# Patient Record
Sex: FEMALE | Race: WHITE | NOT HISPANIC OR LATINO | Employment: FULL TIME | RURAL
[De-identification: names, ages, dates, MRNs, and addresses within clinical notes are randomized per-mention and may not be internally consistent; named-entity substitution may affect disease eponyms.]

---

## 2022-02-25 ENCOUNTER — HOSPITAL ENCOUNTER (INPATIENT)
Facility: HOSPITAL | Age: 27
LOS: 2 days | Discharge: HOME | DRG: 639 | End: 2022-02-27
Attending: HOSPITALIST | Admitting: HOSPITALIST
Payer: COMMERCIAL

## 2022-02-25 ENCOUNTER — HOSPITAL ENCOUNTER (EMERGENCY)
Facility: HOSPITAL | Age: 27
Discharge: OTHER INSTITUTION | End: 2022-02-25
Payer: COMMERCIAL

## 2022-02-25 VITALS
WEIGHT: 180.81 LBS | SYSTOLIC BLOOD PRESSURE: 126 MMHG | DIASTOLIC BLOOD PRESSURE: 59 MMHG | HEIGHT: 65 IN | BODY MASS INDEX: 30.12 KG/M2 | TEMPERATURE: 99 F | OXYGEN SATURATION: 97 % | HEART RATE: 118 BPM | RESPIRATION RATE: 20 BRPM

## 2022-02-25 DIAGNOSIS — E11.10 DKA (DIABETIC KETOACIDOSIS): ICD-10-CM

## 2022-02-25 DIAGNOSIS — E10.10 DIABETIC KETOACIDOSIS WITHOUT COMA ASSOCIATED WITH TYPE 1 DIABETES MELLITUS: Primary | ICD-10-CM

## 2022-02-25 DIAGNOSIS — E10.10 DIABETIC KETOACIDOSIS WITHOUT COMA ASSOCIATED WITH TYPE 1 DIABETES MELLITUS: ICD-10-CM

## 2022-02-25 DIAGNOSIS — E87.5 HYPERKALEMIA: ICD-10-CM

## 2022-02-25 DIAGNOSIS — R06.82 TACHYPNEA: ICD-10-CM

## 2022-02-25 DIAGNOSIS — E10.9 TYPE 1 DIABETES MELLITUS WITHOUT COMPLICATION: ICD-10-CM

## 2022-02-25 DIAGNOSIS — R11.2 NON-INTRACTABLE VOMITING WITH NAUSEA, UNSPECIFIED VOMITING TYPE: ICD-10-CM

## 2022-02-25 LAB
ALBUMIN SERPL BCP-MCNC: 4.5 G/DL (ref 3.5–5)
ALBUMIN/GLOB SERPL: 1.1 {RATIO}
ALP SERPL-CCNC: 132 U/L (ref 37–98)
ALT SERPL W P-5'-P-CCNC: 32 U/L (ref 13–56)
ANION GAP SERPL CALCULATED.3IONS-SCNC: 42 MMOL/L (ref 7–16)
AST SERPL W P-5'-P-CCNC: 44 U/L (ref 15–37)
BASOPHILS # BLD AUTO: 0.02 K/UL (ref 0–0.2)
BASOPHILS NFR BLD AUTO: 0.1 % (ref 0–1)
BASOPHILS NFR BLD MANUAL: 1 % (ref 0–1)
BILIRUB SERPL-MCNC: 0.8 MG/DL (ref 0–1.2)
BILIRUB UR QL STRIP: NEGATIVE
BUN SERPL-MCNC: 20 MG/DL (ref 7–18)
BUN/CREAT SERPL: 19 (ref 6–20)
CALCIUM SERPL-MCNC: 9.4 MG/DL (ref 8.5–10.1)
CHLORIDE SERPL-SCNC: 99 MMOL/L (ref 98–107)
CLARITY UR: CLEAR
CO2 SERPL-SCNC: 5 MMOL/L (ref 21–32)
COLOR UR: YELLOW
CREAT SERPL-MCNC: 1.08 MG/DL (ref 0.55–1.02)
DIFFERENTIAL METHOD BLD: ABNORMAL
EOSINOPHIL # BLD AUTO: 0 K/UL (ref 0–0.5)
EOSINOPHIL NFR BLD AUTO: 0 % (ref 1–4)
EOSINOPHIL NFR BLD MANUAL: 1 % (ref 1–4)
ERYTHROCYTE [DISTWIDTH] IN BLOOD BY AUTOMATED COUNT: 12.5 % (ref 11.5–14.5)
FLUAV AG UPPER RESP QL IA.RAPID: NEGATIVE
FLUBV AG UPPER RESP QL IA.RAPID: NEGATIVE
GLOBULIN SER-MCNC: 4 G/DL (ref 2–4)
GLUCOSE SERPL-MCNC: 179 MG/DL (ref 70–105)
GLUCOSE SERPL-MCNC: 383 MG/DL (ref 74–106)
GLUCOSE UR STRIP-MCNC: 500 MG/DL
HCG UR QL IA.RAPID: NEGATIVE
HCO3 UR-SCNC: 5.3 MMOL/L (ref 24–28)
HCO3 UR-SCNC: <3 MMOL/L (ref 21–28)
HCT VFR BLD AUTO: 42.6 % (ref 38–47)
HGB BLD-MCNC: 14.5 G/DL (ref 12–16)
KETONES UR STRIP-SCNC: >=160 MG/DL
LACTATE SERPL-SCNC: 2.5 MMOL/L (ref 0.4–2)
LEUKOCYTE ESTERASE UR QL STRIP: NEGATIVE
LIPASE SERPL-CCNC: 37 U/L (ref 73–393)
LYMPHOCYTES # BLD AUTO: 1.03 K/UL (ref 1–4.8)
LYMPHOCYTES NFR BLD AUTO: 4.2 % (ref 27–41)
LYMPHOCYTES NFR BLD MANUAL: 4 % (ref 27–41)
MAGNESIUM SERPL-MCNC: 2 MG/DL (ref 1.7–2.3)
MCH RBC QN AUTO: 28.2 PG (ref 27–31)
MCHC RBC AUTO-ENTMCNC: 34 G/DL (ref 32–36)
MCV RBC AUTO: 82.9 FL (ref 80–96)
MONOCYTES # BLD AUTO: 0.77 K/UL (ref 0–0.8)
MONOCYTES NFR BLD AUTO: 3.1 % (ref 2–6)
MONOCYTES NFR BLD MANUAL: 4 % (ref 2–6)
MPC BLD CALC-MCNC: 11 FL (ref 9.4–12.4)
NEUTROPHILS # BLD AUTO: 22.7 K/UL (ref 1.8–7.7)
NEUTROPHILS NFR BLD AUTO: 92.6 % (ref 53–65)
NEUTS SEG NFR BLD MANUAL: 90 % (ref 50–62)
NITRITE UR QL STRIP: NEGATIVE
PCO2 BLDA: 32 MMHG (ref 41–51)
PCO2 BLDA: 7 MMHG (ref 35–48)
PH SMN: 6.83 [PH] (ref 7.32–7.42)
PH SMN: 7.21 [PH] (ref 7.35–7.45)
PH UR STRIP: 5.5 PH UNITS
PLATELET # BLD AUTO: 343 K/UL (ref 150–400)
PO2 BLDA: 53 MMHG (ref 25–40)
PO2 BLDA: 65 MMHG (ref 83–108)
POC BASE EXCESS: -28.1 MMOL/L (ref -2–3)
POC BASE EXCESS: ABNORMAL MMOL/L (ref -2–3)
POC CO2: 6.3 MMOL/L
POC CO2: ABNORMAL MMOL/L
POC SATURATED O2: 52 %
POC SATURATED O2: ABNORMAL %
POTASSIUM SERPL-SCNC: 5.5 MMOL/L (ref 3.5–5.1)
PROT SERPL-MCNC: 8.5 G/DL (ref 6.4–8.2)
PROT UR QL STRIP: NEGATIVE
RBC # BLD AUTO: 5.14 M/UL (ref 4.2–5.4)
RBC # UR STRIP: ABNORMAL /UL
SARS-COV+SARS-COV-2 AG RESP QL IA.RAPID: NEGATIVE
SODIUM SERPL-SCNC: 140 MMOL/L (ref 136–145)
SP GR UR STRIP: 1.02
UROBILINOGEN UR STRIP-ACNC: 0.2 MG/DL
WBC # BLD AUTO: 24.52 K/UL (ref 4.5–11)
WBC #/AREA URNS HPF: NORMAL /HPF

## 2022-02-25 PROCEDURE — 96361 HYDRATE IV INFUSION ADD-ON: CPT

## 2022-02-25 PROCEDURE — 82962 GLUCOSE BLOOD TEST: CPT

## 2022-02-25 PROCEDURE — 83605 ASSAY OF LACTIC ACID: CPT | Performed by: NURSE PRACTITIONER

## 2022-02-25 PROCEDURE — 25000003 PHARM REV CODE 250: Performed by: NURSE PRACTITIONER

## 2022-02-25 PROCEDURE — 93010 EKG 12-LEAD: ICD-10-PCS | Mod: ,,, | Performed by: STUDENT IN AN ORGANIZED HEALTH CARE EDUCATION/TRAINING PROGRAM

## 2022-02-25 PROCEDURE — 83735 ASSAY OF MAGNESIUM: CPT | Performed by: NURSE PRACTITIONER

## 2022-02-25 PROCEDURE — 99285 PR EMERGENCY DEPT VISIT,LEVEL V: ICD-10-PCS | Mod: ,,, | Performed by: NURSE PRACTITIONER

## 2022-02-25 PROCEDURE — 36415 COLL VENOUS BLD VENIPUNCTURE: CPT | Performed by: NURSE PRACTITIONER

## 2022-02-25 PROCEDURE — 20000000 HC ICU ROOM

## 2022-02-25 PROCEDURE — 82803 BLOOD GASES ANY COMBINATION: CPT

## 2022-02-25 PROCEDURE — 63600175 PHARM REV CODE 636 W HCPCS: Performed by: NURSE PRACTITIONER

## 2022-02-25 PROCEDURE — 83690 ASSAY OF LIPASE: CPT | Performed by: NURSE PRACTITIONER

## 2022-02-25 PROCEDURE — 85025 COMPLETE CBC W/AUTO DIFF WBC: CPT | Performed by: NURSE PRACTITIONER

## 2022-02-25 PROCEDURE — 96374 THER/PROPH/DIAG INJ IV PUSH: CPT

## 2022-02-25 PROCEDURE — 80053 COMPREHEN METABOLIC PANEL: CPT | Performed by: NURSE PRACTITIONER

## 2022-02-25 PROCEDURE — 93005 ELECTROCARDIOGRAM TRACING: CPT

## 2022-02-25 PROCEDURE — 99285 EMERGENCY DEPT VISIT HI MDM: CPT | Mod: 25

## 2022-02-25 PROCEDURE — 93010 ELECTROCARDIOGRAM REPORT: CPT | Mod: ,,, | Performed by: STUDENT IN AN ORGANIZED HEALTH CARE EDUCATION/TRAINING PROGRAM

## 2022-02-25 PROCEDURE — 99285 EMERGENCY DEPT VISIT HI MDM: CPT | Mod: ,,, | Performed by: NURSE PRACTITIONER

## 2022-02-25 PROCEDURE — 87428 SARSCOV & INF VIR A&B AG IA: CPT | Performed by: NURSE PRACTITIONER

## 2022-02-25 PROCEDURE — 81025 URINE PREGNANCY TEST: CPT | Performed by: NURSE PRACTITIONER

## 2022-02-25 PROCEDURE — 81001 URINALYSIS AUTO W/SCOPE: CPT | Performed by: NURSE PRACTITIONER

## 2022-02-25 PROCEDURE — 25000003 PHARM REV CODE 250: Performed by: HOSPITALIST

## 2022-02-25 RX ORDER — DEXTROSE MONOHYDRATE 100 MG/ML
INJECTION, SOLUTION INTRAVENOUS
Status: DISCONTINUED | OUTPATIENT
Start: 2022-02-26 | End: 2022-02-27 | Stop reason: HOSPADM

## 2022-02-25 RX ORDER — SODIUM CHLORIDE 9 MG/ML
1000 INJECTION, SOLUTION INTRAVENOUS
Status: COMPLETED | OUTPATIENT
Start: 2022-02-25 | End: 2022-02-25

## 2022-02-25 RX ORDER — ONDANSETRON 2 MG/ML
4 INJECTION INTRAMUSCULAR; INTRAVENOUS
Status: COMPLETED | OUTPATIENT
Start: 2022-02-25 | End: 2022-02-25

## 2022-02-25 RX ORDER — DEXTROSE MONOHYDRATE 100 MG/ML
INJECTION, SOLUTION INTRAVENOUS
Status: DISCONTINUED | OUTPATIENT
Start: 2022-02-26 | End: 2022-02-26

## 2022-02-25 RX ORDER — TRAZODONE HYDROCHLORIDE 50 MG/1
50 TABLET ORAL NIGHTLY PRN
Status: DISCONTINUED | OUTPATIENT
Start: 2022-02-26 | End: 2022-02-27 | Stop reason: HOSPADM

## 2022-02-25 RX ORDER — ONDANSETRON 2 MG/ML
8 INJECTION INTRAMUSCULAR; INTRAVENOUS EVERY 6 HOURS PRN
Status: DISCONTINUED | OUTPATIENT
Start: 2022-02-26 | End: 2022-02-27 | Stop reason: HOSPADM

## 2022-02-25 RX ORDER — BISACODYL 5 MG
10 TABLET, DELAYED RELEASE (ENTERIC COATED) ORAL DAILY PRN
Status: DISCONTINUED | OUTPATIENT
Start: 2022-02-26 | End: 2022-02-27 | Stop reason: HOSPADM

## 2022-02-25 RX ORDER — SODIUM CHLORIDE 0.9 % (FLUSH) 0.9 %
10 SYRINGE (ML) INJECTION
Status: DISCONTINUED | OUTPATIENT
Start: 2022-02-26 | End: 2022-02-27 | Stop reason: HOSPADM

## 2022-02-25 RX ORDER — SODIUM CHLORIDE 450 MG/100ML
INJECTION, SOLUTION INTRAVENOUS CONTINUOUS
Status: DISCONTINUED | OUTPATIENT
Start: 2022-02-26 | End: 2022-02-26

## 2022-02-25 RX ORDER — TRIPROLIDINE/PSEUDOEPHEDRINE 2.5MG-60MG
200 TABLET ORAL
Status: DISCONTINUED | OUTPATIENT
Start: 2022-02-25 | End: 2022-02-25

## 2022-02-25 RX ORDER — LISDEXAMFETAMINE DIMESYLATE 40 MG/1
40 CAPSULE ORAL DAILY
COMMUNITY

## 2022-02-25 RX ORDER — IBUPROFEN 600 MG/1
600 TABLET ORAL
Status: COMPLETED | OUTPATIENT
Start: 2022-02-25 | End: 2022-02-25

## 2022-02-25 RX ORDER — ACETAMINOPHEN 500 MG
1000 TABLET ORAL EVERY 6 HOURS PRN
Status: DISCONTINUED | OUTPATIENT
Start: 2022-02-26 | End: 2022-02-27 | Stop reason: HOSPADM

## 2022-02-25 RX ORDER — GUAIFENESIN/DEXTROMETHORPHAN 100-10MG/5
10 SYRUP ORAL EVERY 6 HOURS PRN
Status: DISCONTINUED | OUTPATIENT
Start: 2022-02-26 | End: 2022-02-27 | Stop reason: HOSPADM

## 2022-02-25 RX ORDER — INSULIN LISPRO 100 [IU]/ML
INJECTION, SOLUTION INTRAVENOUS; SUBCUTANEOUS
COMMUNITY

## 2022-02-25 RX ORDER — ENOXAPARIN SODIUM 100 MG/ML
40 INJECTION SUBCUTANEOUS EVERY 24 HOURS
Status: DISCONTINUED | OUTPATIENT
Start: 2022-02-26 | End: 2022-02-27 | Stop reason: HOSPADM

## 2022-02-25 RX ORDER — SIMETHICONE 80 MG
1 TABLET,CHEWABLE ORAL 3 TIMES DAILY PRN
Status: DISCONTINUED | OUTPATIENT
Start: 2022-02-26 | End: 2022-02-27 | Stop reason: HOSPADM

## 2022-02-25 RX ADMIN — SODIUM CHLORIDE 1000 ML: 9 INJECTION, SOLUTION INTRAVENOUS at 11:02

## 2022-02-25 RX ADMIN — IBUPROFEN 600 MG: 600 TABLET ORAL at 08:02

## 2022-02-25 RX ADMIN — ONDANSETRON 4 MG: 2 INJECTION INTRAMUSCULAR; INTRAVENOUS at 05:02

## 2022-02-25 RX ADMIN — SODIUM CHLORIDE 1000 ML: 9 INJECTION, SOLUTION INTRAVENOUS at 06:02

## 2022-02-25 RX ADMIN — SODIUM CHLORIDE 1000 ML: 9 INJECTION, SOLUTION INTRAVENOUS at 07:02

## 2022-02-25 RX ADMIN — SODIUM CHLORIDE 1000 ML: 9 INJECTION, SOLUTION INTRAVENOUS at 04:02

## 2022-02-25 RX ADMIN — SODIUM CHLORIDE: 4.5 INJECTION, SOLUTION INTRAVENOUS at 11:02

## 2022-02-25 NOTE — ED NOTES
Ambulatory to restroom for urine sample. Back to bed. Placed back on monitors. Will continue to monitor. Denies needs at this time. Will continue to monitor.

## 2022-02-25 NOTE — ED PROVIDER NOTES
Encounter Date: 2/25/2022       History     Chief Complaint   Patient presents with    Vomiting    Hyperglycemia     25 y/o WF with PMHx of IDDM presents pov with c/o hyperglycemia and N/V starting yesterday. Patient has an insulin pump. Mom states patient's breath has a fruity odor. Mom states BS pta 304. Patient denies previous dysuria, cough, sore throat, cough, abd pain or diarrhea.        Review of patient's allergies indicates:  No Known Allergies  Past Medical History:   Diagnosis Date    Diabetes mellitus      Past Surgical History:   Procedure Laterality Date    OOPHORECTOMY Right      History reviewed. No pertinent family history.  Social History     Tobacco Use    Smoking status: Never Smoker    Smokeless tobacco: Never Used   Substance Use Topics    Drug use: Never     Review of Systems   Constitutional: Negative.    HENT: Negative.    Eyes: Negative.    Respiratory: Negative for apnea, cough, choking, chest tightness, shortness of breath, wheezing and stridor.    Cardiovascular: Negative for chest pain, palpitations and leg swelling.   Gastrointestinal: Positive for nausea. Negative for abdominal distention, abdominal pain, anal bleeding, blood in stool, constipation, diarrhea, rectal pain and vomiting.   Endocrine: Negative for cold intolerance, heat intolerance, polydipsia, polyphagia and polyuria.   Genitourinary: Negative.    Musculoskeletal: Negative.    Skin: Negative.    Neurological: Negative.    Psychiatric/Behavioral: Negative.        Physical Exam     Initial Vitals [02/25/22 1642]   BP Pulse Resp Temp SpO2   137/84 (!) 114 (!) 21 98.2 °F (36.8 °C) 100 %      MAP       --         Physical Exam    Nursing note and vitals reviewed.  Constitutional: She appears well-developed and well-nourished.   HENT:   Head: Normocephalic.   Eyes: Conjunctivae and EOM are normal. Pupils are equal, round, and reactive to light.   Neck: Neck supple.   Normal range of motion.  Cardiovascular: Regular  rhythm, normal heart sounds and intact distal pulses. Tachycardia present.  Exam reveals no gallop and no friction rub.    No murmur heard.  Pulmonary/Chest: Breath sounds normal. No respiratory distress. She has no wheezes. She has no rhonchi. She has no rales. She exhibits no tenderness.   Abdominal: Abdomen is soft. Bowel sounds are normal. She exhibits no distension and no mass. There is no abdominal tenderness. There is no rebound and no guarding.   Musculoskeletal:         General: Normal range of motion.      Cervical back: Normal range of motion and neck supple.     Lymphadenopathy:     She has no cervical adenopathy.   Neurological: She is alert and oriented to person, place, and time. She has normal strength.   Skin: Skin is warm and dry. Capillary refill takes less than 2 seconds.   Psychiatric: She has a normal mood and affect. Her behavior is normal. Judgment and thought content normal.         Medical Screening Exam   See Full Note    ED Course   Procedures  Labs Reviewed   COMPREHENSIVE METABOLIC PANEL - Abnormal; Notable for the following components:       Result Value    Potassium 5.5 (*)     CO2 5 (*)     Anion Gap 42 (*)     Glucose 383 (*)     BUN 20 (*)     Creatinine 1.08 (*)     Total Protein 8.5 (*)     Alk Phos 132 (*)     AST 44 (*)     All other components within normal limits   LIPASE - Abnormal; Notable for the following components:    Lipase 37 (*)     All other components within normal limits   URINALYSIS - Abnormal; Notable for the following components:    Glucose,   (*)     Ketones, UA >=160 (*)     Blood, UA Trace-Intact (*)     All other components within normal limits   LACTIC ACID, PLASMA - Abnormal; Notable for the following components:    Lactic Acid 2.5 (*)     All other components within normal limits   CBC WITH DIFFERENTIAL - Abnormal; Notable for the following components:    WBC 24.52 (*)     Neutrophils % 92.6 (*)     Lymphocytes % 4.2 (*)     Neutrophils, Abs 22.70  (*)     Eosinophils % 0.0 (*)     All other components within normal limits   MANUAL DIFFERENTIAL - Abnormal; Notable for the following components:    Segmented Neutrophils, Man % 90 (*)     Lymphocytes, Man % 4 (*)     All other components within normal limits   MAGNESIUM - Normal   HCG QUALITATIVE URINE - Normal   SARS-COV2 (COVID) W/ FLU ANTIGEN - Normal    Narrative:     Negative SARS-CoV results should not be used as the sole basis for treatment or patient management decisions; negative results should be considered in the context of a patient's recent exposures, history and the presene of clinical signs and symptoms consistent with COVID-19.  Negative results should be treated as presumptive and confirmed by molecular assay, if necessary for patient management.   URINALYSIS, MICROSCOPIC - Normal   CBC W/ AUTO DIFFERENTIAL    Narrative:     The following orders were created for panel order CBC auto differential.  Procedure                               Abnormality         Status                     ---------                               -----------         ------                     CBC with Differential[[id number]]        Abnormal            Final result               Manual Differential[[id number]]          Abnormal            Final result                 Please view results for these tests on the individual orders.     EKG Readings: (Independently Interpreted)   Rhythm: Sinus Tachycardia. Heart Rate: 115. Ectopy: No Ectopy. Conduction: Normal. ST Segments: Normal ST Segments. T Waves: Normal. Axis: Normal. Clinical Impression: Sinus Tachycardia     ECG Results          EKG 12-lead (In process)  Result time 02/25/22 19:04:27    In process by Interface, Lab In [facility] (02/25/22 19:04:27)                 Narrative:    Test Reason : E87.5,    Vent. Rate : 115 BPM     Atrial Rate : 115 BPM     P-R Int : 128 ms          QRS Dur : 076 ms      QT Int : 340 ms       P-R-T Axes : 073 076 041 degrees     QTc Int : 470  ms    Sinus tachycardia  Otherwise normal ECG  No previous ECGs available    Referred By: [email or web address]   SELF           Confirmed By:                             Imaging Results          X-Ray Chest 1 View (Final result)  Result time 02/25/22 17:45:49    Final result by Dipak Cameron MD (02/25/22 17:45:49)                 Impression:      No acute findings.      Electronically signed by: Dipak Cameron  Date:    02/25/2022  Time:    17:45             Narrative:    EXAMINATION:  XR CHEST 1 VIEW    CLINICAL HISTORY:  Tachypnea, not elsewhere classified    TECHNIQUE:  Single frontal view of the chest was performed.    COMPARISON:  06/02/2017    FINDINGS:  Heart size normal. Lungs clear. No pneumothorax or pleural effusion.                                 Medications   ibuprofen tablet 600 mg (has no administration in time range)   sodium chloride 0.9% bolus 1,000 mL (0 mLs Intravenous Stopped 2/25/22 1755)   ondansetron injection 4 mg (4 mg Intravenous Given 2/25/22 1700)   sodium chloride 0.9% bolus 1,000 mL (0 mLs Intravenous Stopped 2/25/22 1923)   0.9%  NaCl infusion (1,000 mLs Intravenous New Bag 2/25/22 1949)                 ED Course as of 02/25/22 2047 Fri Feb 25, 2022 1826 Consulted with Dr. Arevalo ([facility] TE). Plan is to continue IV fluids, monitor BS and potassium levels, and transfer for Intensive care. Spoke with [facility]. Advised to contact [facility]. [[place]]   1901 Patient states nausea improved.  [[place]]   1910 Current glucose 258. In NAD. [[place]]   2015 Received call back from [facility] and spoke with Mitzi. Patient will be transferred to [facility] room # 6.  will be accepting physician. [[place]]      ED Course User Index  [[place]] FEDERICO Thompson          Clinical Impression:   Final diagnoses:  [R06.82] Tachypnea  [E87.5] Hyperkalemia  [E10.10] Diabetic ketoacidosis without coma associated with type 1 diabetes mellitus (Primary)  [R11.2] Non-intractable vomiting with nausea, unspecified  vomiting type          ED Disposition Condition    Transfer to Another Facility Stable              FEDERICO Thompson  02/25/22 2031       FEDERICO Thompson  02/25/22 2031       FEDERICO Thompson  02/25/22 2047

## 2022-02-25 NOTE — ED TRIAGE NOTES
Presents to ER with reports of vomiting since yesterday. Mother reports giving the patient a 25mg Phenergan suppository approx. 3.5 hours PTA. Patient uses a Insulin pump and states blood glucose was 304.

## 2022-02-26 PROBLEM — E10.10 DIABETIC KETOACIDOSIS ASSOCIATED WITH TYPE 1 DIABETES MELLITUS: Status: ACTIVE | Noted: 2022-02-26

## 2022-02-26 LAB
ANION GAP SERPL CALCULATED.3IONS-SCNC: 12 MMOL/L (ref 7–16)
ANION GAP SERPL CALCULATED.3IONS-SCNC: 13 MMOL/L (ref 7–16)
ANION GAP SERPL CALCULATED.3IONS-SCNC: 13 MMOL/L (ref 7–16)
ANION GAP SERPL CALCULATED.3IONS-SCNC: 20 MMOL/L (ref 7–16)
BUN SERPL-MCNC: 13 MG/DL (ref 7–18)
BUN SERPL-MCNC: 9 MG/DL (ref 7–18)
BUN/CREAT SERPL: 10 (ref 6–20)
BUN/CREAT SERPL: 10 (ref 6–20)
BUN/CREAT SERPL: 11 (ref 6–20)
BUN/CREAT SERPL: 14 (ref 6–20)
CALCIUM SERPL-MCNC: 7.8 MG/DL (ref 8.5–10.1)
CALCIUM SERPL-MCNC: 7.9 MG/DL (ref 8.5–10.1)
CALCIUM SERPL-MCNC: 7.9 MG/DL (ref 8.5–10.1)
CALCIUM SERPL-MCNC: 8.1 MG/DL (ref 8.5–10.1)
CHLORIDE SERPL-SCNC: 104 MMOL/L (ref 98–107)
CHLORIDE SERPL-SCNC: 106 MMOL/L (ref 98–107)
CHLORIDE SERPL-SCNC: 106 MMOL/L (ref 98–107)
CHLORIDE SERPL-SCNC: 107 MMOL/L (ref 98–107)
CO2 SERPL-SCNC: 16 MMOL/L (ref 21–32)
CO2 SERPL-SCNC: 21 MMOL/L (ref 21–32)
CO2 SERPL-SCNC: 21 MMOL/L (ref 21–32)
CO2 SERPL-SCNC: 22 MMOL/L (ref 21–32)
CREAT SERPL-MCNC: 0.84 MG/DL (ref 0.55–1.02)
CREAT SERPL-MCNC: 0.87 MG/DL (ref 0.55–1.02)
CREAT SERPL-MCNC: 0.87 MG/DL (ref 0.55–1.02)
CREAT SERPL-MCNC: 0.92 MG/DL (ref 0.55–1.02)
EST. AVERAGE GLUCOSE BLD GHB EST-MCNC: 184 MG/DL
GLUCOSE SERPL-MCNC: 215 MG/DL (ref 74–106)
GLUCOSE SERPL-MCNC: 257 MG/DL (ref 74–106)
GLUCOSE SERPL-MCNC: 258 MG/DL (ref 74–106)
GLUCOSE SERPL-MCNC: 259 MG/DL (ref 74–106)
HBA1C MFR BLD HPLC: 8.1 % (ref 4.5–6.6)
MAGNESIUM SERPL-MCNC: 1.8 MG/DL (ref 1.7–2.3)
PHOSPHATE SERPL-MCNC: 3.3 MG/DL (ref 2.5–4.5)
POTASSIUM SERPL-SCNC: 3.3 MMOL/L (ref 3.5–5.1)
POTASSIUM SERPL-SCNC: 4.2 MMOL/L (ref 3.5–5.1)
POTASSIUM SERPL-SCNC: 4.7 MMOL/L (ref 3.5–5.1)
SODIUM SERPL-SCNC: 136 MMOL/L (ref 136–145)
SODIUM SERPL-SCNC: 137 MMOL/L (ref 136–145)
SODIUM SERPL-SCNC: 137 MMOL/L (ref 136–145)
SODIUM SERPL-SCNC: 138 MMOL/L (ref 136–145)

## 2022-02-26 PROCEDURE — 36415 COLL VENOUS BLD VENIPUNCTURE: CPT | Performed by: HOSPITALIST

## 2022-02-26 PROCEDURE — 63600175 PHARM REV CODE 636 W HCPCS: Performed by: HOSPITALIST

## 2022-02-26 PROCEDURE — 25000003 PHARM REV CODE 250: Performed by: HOSPITALIST

## 2022-02-26 PROCEDURE — 80048 BASIC METABOLIC PNL TOTAL CA: CPT | Performed by: HOSPITALIST

## 2022-02-26 PROCEDURE — 20000000 HC ICU ROOM

## 2022-02-26 PROCEDURE — 99223 1ST HOSP IP/OBS HIGH 75: CPT | Mod: ,,, | Performed by: HOSPITALIST

## 2022-02-26 PROCEDURE — 83036 HEMOGLOBIN GLYCOSYLATED A1C: CPT | Performed by: NURSE PRACTITIONER

## 2022-02-26 PROCEDURE — 84100 ASSAY OF PHOSPHORUS: CPT | Performed by: HOSPITALIST

## 2022-02-26 PROCEDURE — 99233 SBSQ HOSP IP/OBS HIGH 50: CPT | Mod: ,,, | Performed by: NURSE PRACTITIONER

## 2022-02-26 PROCEDURE — 99223 PR INITIAL HOSPITAL CARE,LEVL III: ICD-10-PCS | Mod: ,,, | Performed by: HOSPITALIST

## 2022-02-26 PROCEDURE — 83735 ASSAY OF MAGNESIUM: CPT | Performed by: HOSPITALIST

## 2022-02-26 PROCEDURE — 99233 PR SUBSEQUENT HOSPITAL CARE,LEVL III: ICD-10-PCS | Mod: ,,, | Performed by: NURSE PRACTITIONER

## 2022-02-26 PROCEDURE — S5010 5% DEXTROSE AND 0.45% SALINE: HCPCS | Performed by: NURSE PRACTITIONER

## 2022-02-26 PROCEDURE — 25000003 PHARM REV CODE 250: Performed by: NURSE PRACTITIONER

## 2022-02-26 RX ORDER — GLUCAGON 1 MG
1 KIT INJECTION
Status: DISCONTINUED | OUTPATIENT
Start: 2022-02-26 | End: 2022-02-27 | Stop reason: HOSPADM

## 2022-02-26 RX ORDER — DEXTROSE MONOHYDRATE AND SODIUM CHLORIDE 5; .45 G/100ML; G/100ML
INJECTION, SOLUTION INTRAVENOUS CONTINUOUS
Status: DISCONTINUED | OUTPATIENT
Start: 2022-02-26 | End: 2022-02-27 | Stop reason: HOSPADM

## 2022-02-26 RX ADMIN — ENOXAPARIN SODIUM 40 MG: 40 INJECTION SUBCUTANEOUS at 05:02

## 2022-02-26 RX ADMIN — ACETAMINOPHEN 1000 MG: 500 TABLET ORAL at 03:02

## 2022-02-26 RX ADMIN — SODIUM CHLORIDE 3 UNITS/HR: 9 INJECTION, SOLUTION INTRAVENOUS at 12:02

## 2022-02-26 RX ADMIN — SODIUM CHLORIDE 1.1 UNITS/HR: 9 INJECTION, SOLUTION INTRAVENOUS at 05:02

## 2022-02-26 RX ADMIN — DEXTROSE AND SODIUM CHLORIDE: 5; 450 INJECTION, SOLUTION INTRAVENOUS at 09:02

## 2022-02-26 NOTE — HPI
25 yo F presents to [facility] ED with two days of N/V.  No diarrhea or abdominal pain.  She has been T1DM since age [age] and has been very compliant.  Her mother is an RN and when patient began having N/V her mom had recommended a phenergan suppository.  Patient says that she noticed her BS increasing and the GI symptoms continued.  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      Patient has initial ABG 6.8 with a pCO2 of 32 due to serum ketones but quickly compensated wth next pH being 7.2 and pCO2 7.  Her anion gap was now 36 with a BS of 343.  Her WBC 24.  Her N/V resolved and she is hungry.  Her CXR unremarkable and she is COVID negative.  Her lipase 37 and pregnancy test negative.

## 2022-02-26 NOTE — HPI
27 yo F presents to [facility] ED with two days of N/V.  No diarrhea or abdominal pain.  She has been T1DM since age [age] and has been very compliant.  Her mother is an RN and when patient began having N/V her mom had recommended a phenergan suppository.  Patient says that she noticed her BS increasing and the GI symptoms continued.  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       Patient has initial ABG 6.8 with a pCO2 of 32 due to serum ketones but quickly compensated wth next pH being 7.2 and pCO2 7.  Her anion gap was now 36 with a BS of 343.  Her WBC 24.  Her N/V resolved and she is hungry.  Her CXR unremarkable and she is COVID negative.  Her lipase 37 and pregnancy test negative.

## 2022-02-26 NOTE — H&P
[facility] Medicine  History & Physical    Patient Name: Mariposa Rivera  MRN: [id number]  Patient Class: IP- Inpatient  Admission Date: 2/25/2022  Attending Physician: Donnie Conklin MD   Primary Care Provider: SURENDRA Glaser         Patient information was obtained from ER records.     Subjective:     Principal Problem:<principal problem not specified>    Chief Complaint: No chief complaint on file.       HPI: 25 yo F presents to [facility] ED with two days of N/V.  No diarrhea or abdominal pain.  She has been T1DM since age [age] and has been very compliant.  Her mother is an RN and when patient began having N/V her mom had recommended a phenergan suppository.  Patient says that she noticed her BS increasing and the GI symptoms continued.  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      Patient has initial ABG 6.8 with a pCO2 of 32 due to serum ketones but quickly compensated wth next pH being 7.2 and pCO2 7.  Her anion gap was now 36 with a BS of 343.  Her WBC 24.  Her N/V resolved and she is hungry.  Her CXR unremarkable and she is COVID negative.  Her lipase 37 and pregnancy test negative.        Past Medical History:   Diagnosis Date    Diabetes mellitus        Past Surgical History:   Procedure Laterality Date    OOPHORECTOMY Right        Review of patient's allergies indicates:  No Known Allergies    Current Facility-Administered Medications on File Prior to Encounter   Medication    [COMPLETED] 0.9%  NaCl infusion    [COMPLETED] ibuprofen tablet 600 mg    [COMPLETED] ondansetron injection 4 mg    [COMPLETED] sodium chloride 0.9% bolus 1,000 mL    [COMPLETED] sodium chloride 0.9% bolus 1,000 mL    [DISCONTINUED] ibuprofen 100 mg/5 mL suspension 200 mg     Current Outpatient Medications on File Prior to Encounter   Medication Sig    insulin lispro 100 unit/mL injection Inject  into the skin 3 (three) times daily before meals.    lisdexamfetamine (VYVANSE) 40 MG Cap Take 40 mg by mouth once daily.     Family History       Problem Relation (Age of Onset)    No Known Problems Mother          Tobacco Use    Smoking status: Never Smoker    Smokeless tobacco: Never Used   Substance and Sexual Activity    Alcohol use: Never    Drug use: Never    Sexual activity: Not on file     Review of Systems   Constitutional:  Negative for activity change, chills and fever.   HENT:  Negative for congestion, nosebleeds, sinus pressure, sore throat and trouble swallowing.    Eyes:  Negative for visual disturbance.   Respiratory:  Negative for cough and shortness of breath.    Cardiovascular:  Negative for palpitations and leg swelling.   Gastrointestinal:  Positive for nausea and vomiting. Negative for abdominal pain, constipation and diarrhea.   Genitourinary:  Positive for dysuria. Negative for hematuria.   Musculoskeletal:  Negative for arthralgias, back pain, myalgias and neck stiffness.   Skin:  Negative for pallor.   Neurological:  Negative for dizziness, syncope and headaches.   Psychiatric/Behavioral:  Negative for agitation and confusion.    Objective:     Vital Signs (Most Recent):  Temp: 98.4 °F (36.9 °C) (02/26/22 0301)  Pulse: 110 (02/26/22 0400)  Resp: (!) 21 (02/26/22 0400)  BP: 121/64 (02/26/22 0400)  SpO2: 98 % (02/26/22 0400)   Vital Signs (24h Range):  Temp:  [98.2 °F (36.8 °C)-99.5 °F (37.5 °C)] 98.4 °F (36.9 °C)  Pulse:  [102-122] 110  Resp:  [17-27] 21  SpO2:  [96 %-100 %] 98 %  BP: (102-137)/(48-84) 121/64     Weight: 82 kg (180 lb 12.4 oz)  Body mass index is 30.08 kg/m².    Physical Exam        Significant Labs: All pertinent labs within the past 24 hours have been reviewed.    Significant Imaging: I have reviewed all pertinent imaging results/findings within the past 24 hours.    Assessment/Plan:     Diabetic ketoacidosis associated with type 1 diabetes mellitus  Insulin  infusion  Electrolyte replacement  Volume resucitation  IV antiemetics   Monitor closely in ICU        VTE Risk Mitigation (From admission, onward)         Ordered     enoxaparin injection 40 mg  Daily         02/25/22 2315     IP VTE HIGH RISK PATIENT  Once         02/25/22 2315     Place sequential compression device  Until discontinued         02/25/22 2315                   Gely Beauchamp MD  Department of Hospital Medicine   [facility]

## 2022-02-26 NOTE — PLAN OF CARE
Problem: Adult Inpatient Plan of Care  Goal: Plan of Care Review  Outcome: Ongoing, Progressing  Flowsheets (Taken 2/26/2022 0003)  Plan of Care Reviewed With: patient  Goal: Patient-Specific Goal (Individualized)  Outcome: Ongoing, Progressing  Goal: Absence of Hospital-Acquired Illness or Injury  Outcome: Ongoing, Progressing  Goal: Optimal Comfort and Wellbeing  Outcome: Ongoing, Progressing  Goal: Readiness for Transition of Care  Outcome: Ongoing, Progressing

## 2022-02-26 NOTE — ED NOTES
Information faxed to [facility] for consult.  Per DAVID Kilpatrick request ABG results faxed to [facility].

## 2022-02-26 NOTE — SUBJECTIVE & OBJECTIVE
Interval History: awake, alert - tolerating a diet. No nausea/vomiting. Insulin pump was still in use. This has now been removed.     Objective:     Vital Signs (Most Recent):  Temp: 98.4 °F (36.9 °C) (02/26/22 0715)  Pulse: 95 (02/26/22 1100)  Resp: (!) 21 (02/26/22 1100)  BP: (!) 97/57 (02/26/22 1100)  SpO2: 97 % (02/26/22 1100)   Vital Signs (24h Range):  Temp:  [98.2 °F (36.8 °C)-99.5 °F (37.5 °C)] 98.4 °F (36.9 °C)  Pulse:  [] 95  Resp:  [17-28] 21  SpO2:  [96 %-100 %] 97 %  BP: ()/(47-84) 97/57     Weight: 82 kg (180 lb 12.4 oz)  Body mass index is 30.08 kg/m².      Intake/Output Summary (Last 24 hours) at 2/26/2022 1134  Last data filed at 2/26/2022 1100  Gross per 24 hour   Intake 2381.57 ml   Output --   Net 2381.57 ml       Physical Exam  Vitals reviewed.   Constitutional:       Appearance: Normal appearance.   HENT:      Right Ear: External ear normal.      Left Ear: External ear normal.      Nose: Nose normal.      Mouth/Throat:      Mouth: Mucous membranes are moist.   Eyes:      Extraocular Movements: Extraocular movements intact.      Conjunctiva/sclera: Conjunctivae normal.      Pupils: Pupils are equal, round, and reactive to light.   Cardiovascular:      Rate and Rhythm: Normal rate and regular rhythm.   Pulmonary:      Effort: Pulmonary effort is normal.      Breath sounds: Normal breath sounds.   Abdominal:      General: Bowel sounds are normal.      Palpations: Abdomen is soft.   Musculoskeletal:         General: Normal range of motion.      Cervical back: Normal range of motion and neck supple.   Skin:     General: Skin is warm and dry.      Capillary Refill: Capillary refill takes less than 2 seconds.   Neurological:      General: No focal deficit present.      Mental Status: She is alert and oriented to person, place, and time. Mental status is at baseline.   Psychiatric:         Mood and Affect: Mood normal.       Vents:       Lines/Drains/Airways       Peripheral Intravenous  Line  Duration                  Peripheral IV - Single Lumen 02/25/22 1645 22 G Right Hand <1 day                    Significant Labs:    CBC/Anemia Profile:  Recent Labs   Lab 02/25/22  1653   WBC 24.52*   HGB 14.5   HCT 42.6      MCV 82.9   RDW 12.5        Chemistries:  Recent Labs   Lab 02/25/22  1653 02/26/22  0023 02/26/22  0615 02/26/22  0840     --  136  --    K 5.5* 4.7 4.2  --    CL 99  --  104  --    CO2 5*  --  16*  --    BUN 20*  --  13  --    CREATININE 1.08*  --  0.92  --    CALCIUM 9.4  --  8.1*  --    ALBUMIN 4.5  --   --   --    PROT 8.5*  --   --   --    BILITOT 0.8  --   --   --    ALKPHOS 132*  --   --   --    ALT 32  --   --   --    AST 44*  --   --   --    MG 2.0  --   --  1.8   PHOS  --   --   --  3.3       All pertinent labs within the past 24 hours have been reviewed.    Significant Imaging:  I have reviewed all pertinent imaging results/findings within the past 24 hours.

## 2022-02-26 NOTE — PROGRESS NOTES
[facility]  Pulmonology  Progress Note    Patient Name: Mariposa Rivera  MRN: [id number]  Admission Date: 2/25/2022  Hospital Length of Stay: 1 days  Code Status: Full Code  Attending Provider: Donnie Conklin MD  Primary Care Provider: SURENDRA Glaser   Principal Problem: <principal problem not specified>    Subjective:     Interval History: awake, alert - tolerating a diet. No nausea/vomiting. Insulin pump was still in use. This has now been removed.     Objective:     Vital Signs (Most Recent):  Temp: 98.4 °F (36.9 °C) (02/26/22 0715)  Pulse: 95 (02/26/22 1100)  Resp: (!) 21 (02/26/22 1100)  BP: (!) 97/57 (02/26/22 1100)  SpO2: 97 % (02/26/22 1100)   Vital Signs (24h Range):  Temp:  [98.2 °F (36.8 °C)-99.5 °F (37.5 °C)] 98.4 °F (36.9 °C)  Pulse:  [] 95  Resp:  [17-28] 21  SpO2:  [96 %-100 %] 97 %  BP: ()/(47-84) 97/57     Weight: 82 kg (180 lb 12.4 oz)  Body mass index is 30.08 kg/m².      Intake/Output Summary (Last 24 hours) at 2/26/2022 1134  Last data filed at 2/26/2022 1100  Gross per 24 hour   Intake 2381.57 ml   Output --   Net 2381.57 ml       Physical Exam  Vitals reviewed.   Constitutional:       Appearance: Normal appearance.   HENT:      Right Ear: External ear normal.      Left Ear: External ear normal.      Nose: Nose normal.      Mouth/Throat:      Mouth: Mucous membranes are moist.   Eyes:      Extraocular Movements: Extraocular movements intact.      Conjunctiva/sclera: Conjunctivae normal.      Pupils: Pupils are equal, round, and reactive to light.   Cardiovascular:      Rate and Rhythm: Normal rate and regular rhythm.   Pulmonary:      Effort: Pulmonary effort is normal.      Breath sounds: Normal breath sounds.   Abdominal:      General: Bowel sounds are normal.      Palpations: Abdomen is soft.   Musculoskeletal:         General: Normal range of motion.      Cervical back: Normal range of motion and neck supple.   Skin:     General: Skin is warm and  dry.      Capillary Refill: Capillary refill takes less than 2 seconds.   Neurological:      General: No focal deficit present.      Mental Status: She is alert and oriented to person, place, and time. Mental status is at baseline.   Psychiatric:         Mood and Affect: Mood normal.       Vents:       Lines/Drains/Airways       Peripheral Intravenous Line  Duration                  Peripheral IV - Single Lumen 02/25/22 1645 22 G Right Hand <1 day                    Significant Labs:    CBC/Anemia Profile:  Recent Labs   Lab 02/25/22  1653   WBC 24.52*   HGB 14.5   HCT 42.6      MCV 82.9   RDW 12.5        Chemistries:  Recent Labs   Lab 02/25/22  1653 02/26/22  0023 02/26/22  0615 02/26/22  0840     --  136  --    K 5.5* 4.7 4.2  --    CL 99  --  104  --    CO2 5*  --  16*  --    BUN 20*  --  13  --    CREATININE 1.08*  --  0.92  --    CALCIUM 9.4  --  8.1*  --    ALBUMIN 4.5  --   --   --    PROT 8.5*  --   --   --    BILITOT 0.8  --   --   --    ALKPHOS 132*  --   --   --    ALT 32  --   --   --    AST 44*  --   --   --    MG 2.0  --   --  1.8   PHOS  --   --   --  3.3       All pertinent labs within the past 24 hours have been reviewed.    Significant Imaging:  I have reviewed all pertinent imaging results/findings within the past 24 hours.    Assessment/Plan:     Diabetic ketoacidosis associated with type 1 diabetes mellitus  Secondary to nausea/vomting - likely viral gastroenteritis    - currently on insulin infusion and IVFs   - insulin pump was not removed until the time of my exam   - glucose 215 - will add dextrose to IVFs   - continue BMPs - resume home insulin pump once AG is closed                  [facility]  Pulmonology  [facility]

## 2022-02-26 NOTE — NURSING
0100: , started insulin infusing @ 3units/hr     0200: , titrated insulin 1.5unit/hr per protocol    0300: BG 98, stopped insulin     0315: BG 98    0330: BG 93    0400: BG 93    0430:     0500: , started insulin infusion @ 1.1units/hr per protocol.     0600: , insulin @ 1.3units/hr

## 2022-02-26 NOTE — ASSESSMENT & PLAN NOTE
Secondary to nausea/vomting - likely viral gastroenteritis    - currently on insulin infusion and IVFs   - insulin pump was not removed until the time of my exam   - glucose 215 - will add dextrose to IVFs   - continue BMPs - resume home insulin pump once AG is closed

## 2022-02-26 NOTE — ASSESSMENT & PLAN NOTE
Insulin infusion  Electrolyte replacement  Volume resucitation  IV antiemetics   Monitor closely in ICU

## 2022-02-26 NOTE — SUBJECTIVE & OBJECTIVE
Past Medical History:   Diagnosis Date    Diabetes mellitus        Past Surgical History:   Procedure Laterality Date    OOPHORECTOMY Right        Review of patient's allergies indicates:  No Known Allergies    Current Facility-Administered Medications on File Prior to Encounter   Medication    [COMPLETED] 0.9%  NaCl infusion    [COMPLETED] ibuprofen tablet 600 mg    [COMPLETED] ondansetron injection 4 mg    [COMPLETED] sodium chloride 0.9% bolus 1,000 mL    [COMPLETED] sodium chloride 0.9% bolus 1,000 mL    [DISCONTINUED] ibuprofen 100 mg/5 mL suspension 200 mg     Current Outpatient Medications on File Prior to Encounter   Medication Sig    insulin lispro 100 unit/mL injection Inject into the skin 3 (three) times daily before meals.    lisdexamfetamine (VYVANSE) 40 MG Cap Take 40 mg by mouth once daily.     Family History       Problem Relation (Age of Onset)    No Known Problems Mother          Tobacco Use    Smoking status: Never Smoker    Smokeless tobacco: Never Used   Substance and Sexual Activity    Alcohol use: Never    Drug use: Never    Sexual activity: Not on file     Review of Systems   Constitutional:  Negative for activity change, chills and fever.   HENT:  Negative for congestion, nosebleeds, sinus pressure, sore throat and trouble swallowing.    Eyes:  Negative for visual disturbance.   Respiratory:  Negative for cough and shortness of breath.    Cardiovascular:  Negative for palpitations and leg swelling.   Gastrointestinal:  Positive for nausea and vomiting. Negative for abdominal pain, constipation and diarrhea.   Genitourinary:  Positive for dysuria. Negative for hematuria.   Musculoskeletal:  Negative for arthralgias, back pain, myalgias and neck stiffness.   Skin:  Negative for pallor.   Neurological:  Negative for dizziness, syncope and headaches.   Psychiatric/Behavioral:  Negative for agitation and confusion.    Objective:     Vital Signs (Most Recent):  Temp: 98.4 °F (36.9 °C) (02/26/22  0301)  Pulse: 110 (02/26/22 0400)  Resp: (!) 21 (02/26/22 0400)  BP: 121/64 (02/26/22 0400)  SpO2: 98 % (02/26/22 0400)   Vital Signs (24h Range):  Temp:  [98.2 °F (36.8 °C)-99.5 °F (37.5 °C)] 98.4 °F (36.9 °C)  Pulse:  [102-122] 110  Resp:  [17-27] 21  SpO2:  [96 %-100 %] 98 %  BP: (102-137)/(48-84) 121/64     Weight: 82 kg (180 lb 12.4 oz)  Body mass index is 30.08 kg/m².    Physical Exam        Significant Labs: All pertinent labs within the past 24 hours have been reviewed.    Significant Imaging: I have reviewed all pertinent imaging results/findings within the past 24 hours.

## 2022-02-27 VITALS
BODY MASS INDEX: 29.9 KG/M2 | TEMPERATURE: 98 F | WEIGHT: 179.44 LBS | SYSTOLIC BLOOD PRESSURE: 90 MMHG | RESPIRATION RATE: 26 BRPM | OXYGEN SATURATION: 98 % | HEIGHT: 65 IN | DIASTOLIC BLOOD PRESSURE: 50 MMHG | HEART RATE: 77 BPM

## 2022-02-27 PROBLEM — E10.10 DIABETIC KETOACIDOSIS ASSOCIATED WITH TYPE 1 DIABETES MELLITUS: Status: RESOLVED | Noted: 2022-02-26 | Resolved: 2022-02-27

## 2022-02-27 PROBLEM — E11.9 DM (DIABETES MELLITUS): Status: ACTIVE | Noted: 2022-02-27

## 2022-02-27 PROBLEM — E11.10 DKA (DIABETIC KETOACIDOSIS): Status: ACTIVE | Noted: 2022-02-26

## 2022-02-27 LAB
ANION GAP SERPL CALCULATED.3IONS-SCNC: 15 MMOL/L (ref 7–16)
BUN SERPL-MCNC: 7 MG/DL (ref 7–18)
BUN/CREAT SERPL: 11 (ref 6–20)
CALCIUM SERPL-MCNC: 8.5 MG/DL (ref 8.5–10.1)
CHLORIDE SERPL-SCNC: 108 MMOL/L (ref 98–107)
CO2 SERPL-SCNC: 22 MMOL/L (ref 21–32)
CREAT SERPL-MCNC: 0.63 MG/DL (ref 0.55–1.02)
GLUCOSE SERPL-MCNC: 155 MG/DL (ref 70–105)
GLUCOSE SERPL-MCNC: 179 MG/DL (ref 74–106)
POTASSIUM SERPL-SCNC: 3.6 MMOL/L (ref 3.5–5.1)
SODIUM SERPL-SCNC: 141 MMOL/L (ref 136–145)

## 2022-02-27 PROCEDURE — 36415 COLL VENOUS BLD VENIPUNCTURE: CPT | Performed by: NURSE PRACTITIONER

## 2022-02-27 PROCEDURE — 99238 HOSP IP/OBS DSCHRG MGMT 30/<: CPT | Mod: ,,, | Performed by: NURSE PRACTITIONER

## 2022-02-27 PROCEDURE — 80048 BASIC METABOLIC PNL TOTAL CA: CPT | Performed by: NURSE PRACTITIONER

## 2022-02-27 PROCEDURE — 99238 PR HOSPITAL DISCHARGE DAY,<30 MIN: ICD-10-PCS | Mod: ,,, | Performed by: NURSE PRACTITIONER

## 2022-02-27 PROCEDURE — 82962 GLUCOSE BLOOD TEST: CPT

## 2022-02-27 NOTE — HOSPITAL COURSE
25 yo F presents to [facility] ED with two days of N/V.  No diarrhea or abdominal pain.  She has been T1DM since age [age] and has been very compliant.  Her mother is an RN and when patient began having N/V her mom had recommended a phenergan suppository.  Patient says that she noticed her BS increasing and the GI symptoms continued.  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       Patient has initial ABG 6.8 with a pCO2 of 32 due to serum ketones but quickly compensated wth next pH being 7.2 and pCO2 7.  Her anion gap was now 36 with a BS of 343.  Her WBC 24.  Her N/V resolved and she is hungry.  Her CXR unremarkable and she is COVID negative.  Her lipase 37 and pregnancy test negative.      Anion Gap closed, nausea-vomiting resolved. Home insulin pump resumed.  Will d/c home to follow up with PCP in 1-2 weeks.

## 2022-02-27 NOTE — DISCHARGE SUMMARY
[facility] ICU  Pulmonology  Discharge Summary      Patient Name: Mariposa Rivera  MRN: [id number]  Admission Date: 2/25/2022  Hospital Length of Stay: 2 days  Discharge Date and Time:  02/27/2022 8:50 AM  Attending Physician: Donnie Conklin MD   Discharging Provider: Soledad Preston, AG-ACNP  Primary Care Provider: KENYON GlaserP-C    HPI:   25 yo F presents to [facility] ED with two days of N/V.  No diarrhea or abdominal pain.  She has been T1DM since age [age] and has been very compliant.  Her mother is an RN and when patient began having N/V her mom had recommended a phenergan suppository.  Patient says that she noticed her BS increasing and the GI symptoms continued.  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       Patient has initial ABG 6.8 with a pCO2 of 32 due to serum ketones but quickly compensated wth next pH being 7.2 and pCO2 7.  Her anion gap was now 36 with a BS of 343.  Her WBC 24.  Her N/V resolved and she is hungry.  Her CXR unremarkable and she is COVID negative.  Her lipase 37 and pregnancy test negative.           * No surgery found *    Indwelling Lines/Drains at Time of Discharge:   Lines/Drains/Airways     None                 Hospital Course:     Anion Gap closed, nausea-vomiting resolved. Home insulin pump resumed.  Will d/c home to follow up with PCP in 1-2 weeks.       Goals of Care Treatment Preferences:  Code Status: Full Code          Significant Labs:  All pertinent labs within the past 24 hours have been reviewed.    Significant Imaging:  I have reviewed all pertinent imaging results/findings within the past 24 hours.    Pending Diagnostic Studies:     None        Final Active Diagnoses:    Diagnosis Date Noted POA    DM (diabetes mellitus) [E11.9] 02/27/2022 Unknown      Problems Resolved During this Admission:    Diagnosis Date Noted Date Resolved POA    Diabetic  ketoacidosis associated with type 1 diabetes mellitus [E10.10] 02/26/2022 02/27/2022 Yes       Discharged Condition: stable    Disposition:     Follow Up:    Patient Instructions:   No discharge procedures on file.  Medications:  Reconciled Home Medications:      Medication List      CONTINUE taking these medications    insulin lispro 100 unit/mL injection  Inject into the skin 3 (three) times daily before meals.     lisdexamfetamine 40 MG Cap  Commonly known as: VYVANSE  Take 40 mg by mouth once daily.            MOHIT Mock-ACNP  Pulmonology  [facility]

## 2022-02-27 NOTE — NURSING
Pts VSS and glucose under control via home regimen. Discussed D/C instructions with Pt and Pt verbalized understanding.  Parents at BS and Pt wheeled to hospital entrance where parents drove Pt home.

## 2022-02-27 NOTE — PLAN OF CARE
Problem: Adult Inpatient Plan of Care  Goal: Plan of Care Review  Outcome: Met  Goal: Patient-Specific Goal (Individualized)  Outcome: Met  Goal: Absence of Hospital-Acquired Illness or Injury  Outcome: Met  Goal: Optimal Comfort and Wellbeing  Outcome: Met  Goal: Readiness for Transition of Care  Outcome: Met     Problem: Diabetes Comorbidity  Goal: Blood Glucose Level Within Targeted Range  Outcome: Met

## 2023-08-26 ENCOUNTER — HOSPITAL ENCOUNTER (EMERGENCY)
Facility: HOSPITAL | Age: 28
Discharge: ANOTHER HOSPITAL | End: 2023-08-26
Payer: COMMERCIAL

## 2023-08-26 VITALS
TEMPERATURE: 98 F | SYSTOLIC BLOOD PRESSURE: 144 MMHG | OXYGEN SATURATION: 99 % | BODY MASS INDEX: 25.66 KG/M2 | RESPIRATION RATE: 28 BRPM | DIASTOLIC BLOOD PRESSURE: 87 MMHG | WEIGHT: 154 LBS | HEIGHT: 65 IN | HEART RATE: 134 BPM

## 2023-08-26 DIAGNOSIS — E10.10 DIABETIC KETOACIDOSIS WITHOUT COMA ASSOCIATED WITH TYPE 1 DIABETES MELLITUS: Primary | ICD-10-CM

## 2023-08-26 DIAGNOSIS — D72.829 LEUKOCYTOSIS, UNSPECIFIED TYPE: ICD-10-CM

## 2023-08-26 DIAGNOSIS — R73.9 HYPERGLYCEMIA: ICD-10-CM

## 2023-08-26 LAB
ALBUMIN SERPL BCP-MCNC: 4.9 G/DL (ref 3.5–5)
ALBUMIN/GLOB SERPL: 1.3 {RATIO}
ALP SERPL-CCNC: 137 U/L (ref 37–98)
ALT SERPL W P-5'-P-CCNC: 34 U/L (ref 13–56)
ANION GAP SERPL CALCULATED.3IONS-SCNC: 36 MMOL/L (ref 7–16)
AST SERPL W P-5'-P-CCNC: 18 U/L (ref 15–37)
BACTERIA #/AREA URNS HPF: ABNORMAL /HPF
BASOPHILS # BLD AUTO: 0.04 K/UL (ref 0–0.2)
BASOPHILS NFR BLD AUTO: 0.1 % (ref 0–1)
BILIRUB SERPL-MCNC: 0.8 MG/DL (ref ?–1.2)
BILIRUB UR QL STRIP: ABNORMAL
BUN SERPL-MCNC: 13 MG/DL (ref 7–18)
BUN/CREAT SERPL: 8 (ref 6–20)
CALCIUM SERPL-MCNC: 10.6 MG/DL (ref 8.5–10.1)
CHLORIDE SERPL-SCNC: 95 MMOL/L (ref 98–107)
CLARITY UR: ABNORMAL
CO2 SERPL-SCNC: 10 MMOL/L (ref 21–32)
COLOR UR: YELLOW
CREAT SERPL-MCNC: 1.6 MG/DL (ref 0.55–1.02)
DIFFERENTIAL METHOD BLD: ABNORMAL
EGFR (NO RACE VARIABLE) (RUSH/TITUS): 46 ML/MIN/1.73M2
EOSINOPHIL # BLD AUTO: 0 K/UL (ref 0–0.5)
EOSINOPHIL NFR BLD AUTO: 0 % (ref 1–4)
ERYTHROCYTE [DISTWIDTH] IN BLOOD BY AUTOMATED COUNT: 13.4 % (ref 11.5–14.5)
EST. AVERAGE GLUCOSE BLD GHB EST-MCNC: 210 MG/DL
GLOBULIN SER-MCNC: 3.7 G/DL (ref 2–4)
GLUCOSE SERPL-MCNC: 331 MG/DL (ref 70–105)
GLUCOSE SERPL-MCNC: 440 MG/DL (ref 74–106)
GLUCOSE SERPL-MCNC: 448 MG/DL (ref 70–105)
GLUCOSE UR STRIP-MCNC: 500 MG/DL
HBA1C MFR BLD HPLC: 8.9 % (ref 4.5–6.6)
HCO3 UR-SCNC: 8.8 MMOL/L (ref 24–28)
HCT VFR BLD AUTO: 48.5 % (ref 38–47)
HGB BLD-MCNC: 16.3 G/DL (ref 12–16)
KETONES UR STRIP-SCNC: >=160 MG/DL
LACTATE SERPL-SCNC: 2.6 MMOL/L (ref 0.4–2)
LEUKOCYTE ESTERASE UR QL STRIP: NEGATIVE
LYMPHOCYTES # BLD AUTO: 1.43 K/UL (ref 1–4.8)
LYMPHOCYTES NFR BLD AUTO: 5.2 % (ref 27–41)
MAGNESIUM SERPL-MCNC: 1.8 MG/DL (ref 1.7–2.3)
MCH RBC QN AUTO: 27.7 PG (ref 27–31)
MCHC RBC AUTO-ENTMCNC: 33.6 G/DL (ref 32–36)
MCV RBC AUTO: 82.3 FL (ref 80–96)
MONOCYTES # BLD AUTO: 1.57 K/UL (ref 0–0.8)
MONOCYTES NFR BLD AUTO: 5.8 % (ref 2–6)
MPC BLD CALC-MCNC: 10.1 FL (ref 9.4–12.4)
NEUTROPHILS # BLD AUTO: 24.26 K/UL (ref 1.8–7.7)
NEUTROPHILS NFR BLD AUTO: 88.9 % (ref 53–65)
NITRITE UR QL STRIP: NEGATIVE
PCO2 BLDA: 29 MMHG (ref 41–51)
PH SMN: 7.09 [PH] (ref 7.32–7.42)
PH UR STRIP: 5.5 PH UNITS
PHOSPHATE SERPL-MCNC: 6.1 MG/DL (ref 2.5–4.5)
PLATELET # BLD AUTO: 497 K/UL (ref 150–400)
PO2 BLDA: 24 MMHG (ref 25–40)
POC BASE EXCESS: -19.7 MMOL/L (ref -2–3)
POC CO2: 9.7 MMOL/L
POC SATURATED O2: 22 %
POTASSIUM SERPL-SCNC: 4.6 MMOL/L (ref 3.5–5.1)
PROT SERPL-MCNC: 8.6 G/DL (ref 6.4–8.2)
PROT UR QL STRIP: 100
RBC # BLD AUTO: 5.89 M/UL (ref 4.2–5.4)
RBC # UR STRIP: ABNORMAL /UL
RBC #/AREA URNS HPF: ABNORMAL /HPF
SODIUM SERPL-SCNC: 136 MMOL/L (ref 136–145)
SP GR UR STRIP: 1.02
SQUAMOUS #/AREA URNS LPF: ABNORMAL /LPF
UROBILINOGEN UR STRIP-ACNC: 0.2 MG/DL
WBC # BLD AUTO: 27.3 K/UL (ref 4.5–11)
WBC #/AREA URNS HPF: ABNORMAL /HPF

## 2023-08-26 PROCEDURE — 96361 HYDRATE IV INFUSION ADD-ON: CPT

## 2023-08-26 PROCEDURE — 96376 TX/PRO/DX INJ SAME DRUG ADON: CPT

## 2023-08-26 PROCEDURE — 99285 EMERGENCY DEPT VISIT HI MDM: CPT | Mod: 25

## 2023-08-26 PROCEDURE — 25000003 PHARM REV CODE 250: Performed by: NURSE PRACTITIONER

## 2023-08-26 PROCEDURE — 83036 HEMOGLOBIN GLYCOSYLATED A1C: CPT | Performed by: NURSE PRACTITIONER

## 2023-08-26 PROCEDURE — 82803 BLOOD GASES ANY COMBINATION: CPT

## 2023-08-26 PROCEDURE — 83735 ASSAY OF MAGNESIUM: CPT | Performed by: NURSE PRACTITIONER

## 2023-08-26 PROCEDURE — 83605 ASSAY OF LACTIC ACID: CPT | Performed by: NURSE PRACTITIONER

## 2023-08-26 PROCEDURE — 93010 ELECTROCARDIOGRAM REPORT: CPT | Mod: ,,, | Performed by: INTERNAL MEDICINE

## 2023-08-26 PROCEDURE — 96375 TX/PRO/DX INJ NEW DRUG ADDON: CPT

## 2023-08-26 PROCEDURE — 81001 URINALYSIS AUTO W/SCOPE: CPT | Performed by: NURSE PRACTITIONER

## 2023-08-26 PROCEDURE — 99285 EMERGENCY DEPT VISIT HI MDM: CPT | Mod: ,,, | Performed by: NURSE PRACTITIONER

## 2023-08-26 PROCEDURE — 96374 THER/PROPH/DIAG INJ IV PUSH: CPT

## 2023-08-26 PROCEDURE — 36600 WITHDRAWAL OF ARTERIAL BLOOD: CPT

## 2023-08-26 PROCEDURE — 93010 EKG 12-LEAD: ICD-10-PCS | Mod: ,,, | Performed by: INTERNAL MEDICINE

## 2023-08-26 PROCEDURE — 84100 ASSAY OF PHOSPHORUS: CPT | Performed by: NURSE PRACTITIONER

## 2023-08-26 PROCEDURE — 80053 COMPREHEN METABOLIC PANEL: CPT | Performed by: NURSE PRACTITIONER

## 2023-08-26 PROCEDURE — 63600175 PHARM REV CODE 636 W HCPCS: Performed by: NURSE PRACTITIONER

## 2023-08-26 PROCEDURE — 82962 GLUCOSE BLOOD TEST: CPT

## 2023-08-26 PROCEDURE — 99285 PR EMERGENCY DEPT VISIT,LEVEL V: ICD-10-PCS | Mod: ,,, | Performed by: NURSE PRACTITIONER

## 2023-08-26 PROCEDURE — 93005 ELECTROCARDIOGRAM TRACING: CPT

## 2023-08-26 PROCEDURE — 85025 COMPLETE CBC W/AUTO DIFF WBC: CPT | Performed by: NURSE PRACTITIONER

## 2023-08-26 RX ORDER — SODIUM CHLORIDE 9 MG/ML
1000 INJECTION, SOLUTION INTRAVENOUS CONTINUOUS
Status: DISCONTINUED | OUTPATIENT
Start: 2023-08-26 | End: 2023-08-27 | Stop reason: HOSPADM

## 2023-08-26 RX ORDER — DEXTROSE MONOHYDRATE AND SODIUM CHLORIDE 5; .45 G/100ML; G/100ML
INJECTION, SOLUTION INTRAVENOUS CONTINUOUS PRN
Status: DISCONTINUED | OUTPATIENT
Start: 2023-08-26 | End: 2023-08-27 | Stop reason: HOSPADM

## 2023-08-26 RX ORDER — ONDANSETRON 2 MG/ML
4 INJECTION INTRAMUSCULAR; INTRAVENOUS
Status: COMPLETED | OUTPATIENT
Start: 2023-08-26 | End: 2023-08-26

## 2023-08-26 RX ORDER — SODIUM CHLORIDE 0.9 % (FLUSH) 0.9 %
10 SYRINGE (ML) INJECTION
Status: DISCONTINUED | OUTPATIENT
Start: 2023-08-26 | End: 2023-08-27 | Stop reason: HOSPADM

## 2023-08-26 RX ADMIN — SODIUM CHLORIDE 1000 ML: 9 INJECTION, SOLUTION INTRAVENOUS at 10:08

## 2023-08-26 RX ADMIN — INSULIN HUMAN 0.05 UNITS/KG/HR: 1 INJECTION, SOLUTION INTRAVENOUS at 10:08

## 2023-08-26 RX ADMIN — ONDANSETRON 4 MG: 2 INJECTION INTRAMUSCULAR; INTRAVENOUS at 09:08

## 2023-08-26 RX ADMIN — ONDANSETRON 4 MG: 2 INJECTION INTRAMUSCULAR; INTRAVENOUS at 11:08

## 2023-08-26 RX ADMIN — SODIUM CHLORIDE 1000 ML: 9 INJECTION, SOLUTION INTRAVENOUS at 09:08

## 2023-08-27 ENCOUNTER — HOSPITAL ENCOUNTER (INPATIENT)
Facility: HOSPITAL | Age: 28
LOS: 1 days | Discharge: HOME | DRG: 919 | End: 2023-08-28
Attending: HOSPITALIST | Admitting: HOSPITALIST
Payer: COMMERCIAL

## 2023-08-27 DIAGNOSIS — E10.10 TYPE 1 DIABETES MELLITUS WITH KETOACIDOSIS WITHOUT COMA: Primary | ICD-10-CM

## 2023-08-27 DIAGNOSIS — E11.10 DKA (DIABETIC KETOACIDOSIS): ICD-10-CM

## 2023-08-27 DIAGNOSIS — R00.0 TACHYCARDIA: ICD-10-CM

## 2023-08-27 DIAGNOSIS — E10.10 DIABETIC KETOACIDOSIS WITHOUT COMA ASSOCIATED WITH TYPE 1 DIABETES MELLITUS: ICD-10-CM

## 2023-08-27 DIAGNOSIS — N17.9 AKI (ACUTE KIDNEY INJURY): ICD-10-CM

## 2023-08-27 PROBLEM — D72.829 LEUKOCYTOSIS: Status: ACTIVE | Noted: 2023-08-27

## 2023-08-27 LAB
ANION GAP SERPL CALCULATED.3IONS-SCNC: 10 MMOL/L (ref 7–16)
ANION GAP SERPL CALCULATED.3IONS-SCNC: 14 MMOL/L (ref 7–16)
ANION GAP SERPL CALCULATED.3IONS-SCNC: 27 MMOL/L (ref 7–16)
APTT PPP: 26.3 SECONDS (ref 25.2–37.3)
BUN SERPL-MCNC: 11 MG/DL (ref 7–18)
BUN SERPL-MCNC: 8 MG/DL (ref 7–18)
BUN SERPL-MCNC: 9 MG/DL (ref 7–18)
BUN/CREAT SERPL: 7 (ref 6–20)
BUN/CREAT SERPL: 8 (ref 6–20)
BUN/CREAT SERPL: 9 (ref 6–20)
CALCIUM SERPL-MCNC: 8.7 MG/DL (ref 8.5–10.1)
CALCIUM SERPL-MCNC: 9.1 MG/DL (ref 8.5–10.1)
CALCIUM SERPL-MCNC: 9.3 MG/DL (ref 8.5–10.1)
CHLORIDE SERPL-SCNC: 107 MMOL/L (ref 98–107)
CHLORIDE SERPL-SCNC: 110 MMOL/L (ref 98–107)
CHLORIDE SERPL-SCNC: 111 MMOL/L (ref 98–107)
CO2 SERPL-SCNC: 10 MMOL/L (ref 21–32)
CO2 SERPL-SCNC: 17 MMOL/L (ref 21–32)
CO2 SERPL-SCNC: 21 MMOL/L (ref 21–32)
CREAT SERPL-MCNC: 1.1 MG/DL (ref 0.55–1.02)
CREAT SERPL-MCNC: 1.15 MG/DL (ref 0.55–1.02)
CREAT SERPL-MCNC: 1.27 MG/DL (ref 0.55–1.02)
EGFR (NO RACE VARIABLE) (RUSH/TITUS): 60 ML/MIN/1.73M2
EGFR (NO RACE VARIABLE) (RUSH/TITUS): 68 ML/MIN/1.73M2
EGFR (NO RACE VARIABLE) (RUSH/TITUS): 72 ML/MIN/1.73M2
GLUCOSE SERPL-MCNC: 116 MG/DL (ref 70–105)
GLUCOSE SERPL-MCNC: 122 MG/DL (ref 70–105)
GLUCOSE SERPL-MCNC: 125 MG/DL (ref 70–105)
GLUCOSE SERPL-MCNC: 146 MG/DL (ref 70–105)
GLUCOSE SERPL-MCNC: 152 MG/DL (ref 74–106)
GLUCOSE SERPL-MCNC: 157 MG/DL (ref 70–105)
GLUCOSE SERPL-MCNC: 162 MG/DL (ref 70–105)
GLUCOSE SERPL-MCNC: 162 MG/DL (ref 70–105)
GLUCOSE SERPL-MCNC: 163 MG/DL (ref 74–106)
GLUCOSE SERPL-MCNC: 164 MG/DL (ref 70–105)
GLUCOSE SERPL-MCNC: 169 MG/DL (ref 70–105)
GLUCOSE SERPL-MCNC: 175 MG/DL (ref 70–105)
GLUCOSE SERPL-MCNC: 183 MG/DL (ref 70–105)
GLUCOSE SERPL-MCNC: 183 MG/DL (ref 70–105)
GLUCOSE SERPL-MCNC: 190 MG/DL (ref 70–105)
GLUCOSE SERPL-MCNC: 192 MG/DL (ref 70–105)
GLUCOSE SERPL-MCNC: 194 MG/DL (ref 70–105)
GLUCOSE SERPL-MCNC: 195 MG/DL (ref 70–105)
GLUCOSE SERPL-MCNC: 197 MG/DL (ref 70–105)
GLUCOSE SERPL-MCNC: 203 MG/DL (ref 70–105)
GLUCOSE SERPL-MCNC: 235 MG/DL (ref 70–105)
GLUCOSE SERPL-MCNC: 253 MG/DL (ref 74–106)
GLUCOSE SERPL-MCNC: 267 MG/DL (ref 70–105)
GLUCOSE SERPL-MCNC: 302 MG/DL (ref 70–105)
GLUCOSE SERPL-MCNC: 302 MG/DL (ref 70–105)
HCG SERUM QUALITATIVE: NEGATIVE
INR BLD: 1.18
LACTATE SERPL-SCNC: 1 MMOL/L (ref 0.4–2)
MAGNESIUM SERPL-MCNC: 2 MG/DL (ref 1.7–2.3)
PHOSPHATE SERPL-MCNC: 3.9 MG/DL (ref 2.5–4.5)
POTASSIUM SERPL-SCNC: 2.9 MMOL/L (ref 3.5–5.1)
POTASSIUM SERPL-SCNC: 3.6 MMOL/L (ref 3.5–5.1)
POTASSIUM SERPL-SCNC: 4.4 MMOL/L (ref 3.5–5.1)
PROTHROMBIN TIME: 14.9 SECONDS (ref 11.7–14.7)
SODIUM SERPL-SCNC: 138 MMOL/L (ref 136–145)
SODIUM SERPL-SCNC: 138 MMOL/L (ref 136–145)
SODIUM SERPL-SCNC: 140 MMOL/L (ref 136–145)
TROPONIN I SERPL DL<=0.01 NG/ML-MCNC: 5.6 PG/ML
TROPONIN I SERPL DL<=0.01 NG/ML-MCNC: 6 PG/ML

## 2023-08-27 PROCEDURE — 20000000 HC ICU ROOM

## 2023-08-27 PROCEDURE — 82962 GLUCOSE BLOOD TEST: CPT

## 2023-08-27 PROCEDURE — 83605 ASSAY OF LACTIC ACID: CPT | Performed by: HOSPITALIST

## 2023-08-27 PROCEDURE — 93010 ELECTROCARDIOGRAM REPORT: CPT | Mod: ,,, | Performed by: HOSPITALIST

## 2023-08-27 PROCEDURE — 63600175 PHARM REV CODE 636 W HCPCS: Performed by: NURSE PRACTITIONER

## 2023-08-27 PROCEDURE — 25000003 PHARM REV CODE 250: Performed by: HOSPITALIST

## 2023-08-27 PROCEDURE — 84100 ASSAY OF PHOSPHORUS: CPT | Performed by: HOSPITALIST

## 2023-08-27 PROCEDURE — 99223 PR INITIAL HOSPITAL CARE,LEVL III: ICD-10-PCS | Mod: ,,, | Performed by: HOSPITALIST

## 2023-08-27 PROCEDURE — 83735 ASSAY OF MAGNESIUM: CPT | Performed by: HOSPITALIST

## 2023-08-27 PROCEDURE — 84484 ASSAY OF TROPONIN QUANT: CPT | Performed by: HOSPITALIST

## 2023-08-27 PROCEDURE — 80048 BASIC METABOLIC PNL TOTAL CA: CPT | Performed by: HOSPITALIST

## 2023-08-27 PROCEDURE — 99291 CRITICAL CARE FIRST HOUR: CPT | Mod: ,,, | Performed by: NURSE PRACTITIONER

## 2023-08-27 PROCEDURE — 63600175 PHARM REV CODE 636 W HCPCS: Performed by: HOSPITALIST

## 2023-08-27 PROCEDURE — 93010 EKG 12-LEAD: ICD-10-PCS | Mod: ,,, | Performed by: HOSPITALIST

## 2023-08-27 PROCEDURE — 85730 THROMBOPLASTIN TIME PARTIAL: CPT | Performed by: HOSPITALIST

## 2023-08-27 PROCEDURE — 99223 1ST HOSP IP/OBS HIGH 75: CPT | Mod: ,,, | Performed by: HOSPITALIST

## 2023-08-27 PROCEDURE — 99291 PR CRITICAL CARE, E/M 30-74 MINUTES: ICD-10-PCS | Mod: ,,, | Performed by: NURSE PRACTITIONER

## 2023-08-27 PROCEDURE — 93005 ELECTROCARDIOGRAM TRACING: CPT

## 2023-08-27 PROCEDURE — S5010 5% DEXTROSE AND 0.45% SALINE: HCPCS | Performed by: HOSPITALIST

## 2023-08-27 PROCEDURE — 84703 CHORIONIC GONADOTROPIN ASSAY: CPT | Performed by: HOSPITALIST

## 2023-08-27 PROCEDURE — 25000003 PHARM REV CODE 250: Performed by: NURSE PRACTITIONER

## 2023-08-27 PROCEDURE — 94761 N-INVAS EAR/PLS OXIMETRY MLT: CPT

## 2023-08-27 RX ORDER — BISACODYL 5 MG
10 TABLET, DELAYED RELEASE (ENTERIC COATED) ORAL DAILY PRN
Status: DISCONTINUED | OUTPATIENT
Start: 2023-08-27 | End: 2023-08-28 | Stop reason: HOSPADM

## 2023-08-27 RX ORDER — TALC
6 POWDER (GRAM) TOPICAL NIGHTLY PRN
Status: DISCONTINUED | OUTPATIENT
Start: 2023-08-27 | End: 2023-08-28 | Stop reason: HOSPADM

## 2023-08-27 RX ORDER — MUPIROCIN 20 MG/G
OINTMENT TOPICAL 2 TIMES DAILY
Status: DISCONTINUED | OUTPATIENT
Start: 2023-08-27 | End: 2023-08-28 | Stop reason: HOSPADM

## 2023-08-27 RX ORDER — DEXTROSE MONOHYDRATE AND SODIUM CHLORIDE 5; .45 G/100ML; G/100ML
125 INJECTION, SOLUTION INTRAVENOUS CONTINUOUS PRN
Status: DISCONTINUED | OUTPATIENT
Start: 2023-08-27 | End: 2023-08-28

## 2023-08-27 RX ORDER — METOCLOPRAMIDE HYDROCHLORIDE 5 MG/ML
10 INJECTION INTRAMUSCULAR; INTRAVENOUS ONCE
Status: COMPLETED | OUTPATIENT
Start: 2023-08-27 | End: 2023-08-27

## 2023-08-27 RX ORDER — ONDANSETRON 2 MG/ML
8 INJECTION INTRAMUSCULAR; INTRAVENOUS EVERY 6 HOURS PRN
Status: DISCONTINUED | OUTPATIENT
Start: 2023-08-27 | End: 2023-08-28 | Stop reason: HOSPADM

## 2023-08-27 RX ORDER — FAMOTIDINE 10 MG/ML
20 INJECTION INTRAVENOUS EVERY 12 HOURS
Status: DISCONTINUED | OUTPATIENT
Start: 2023-08-27 | End: 2023-08-28

## 2023-08-27 RX ORDER — POTASSIUM CHLORIDE 20 MEQ/1
20 TABLET, EXTENDED RELEASE ORAL 3 TIMES DAILY
Status: DISCONTINUED | OUTPATIENT
Start: 2023-08-28 | End: 2023-08-28 | Stop reason: HOSPADM

## 2023-08-27 RX ORDER — ACETAMINOPHEN 325 MG/1
650 TABLET ORAL EVERY 4 HOURS PRN
Status: DISCONTINUED | OUTPATIENT
Start: 2023-08-27 | End: 2023-08-27

## 2023-08-27 RX ORDER — SODIUM CHLORIDE 9 MG/ML
125 INJECTION, SOLUTION INTRAVENOUS CONTINUOUS
Status: DISCONTINUED | OUTPATIENT
Start: 2023-08-27 | End: 2023-08-27

## 2023-08-27 RX ORDER — SIMETHICONE 80 MG
1 TABLET,CHEWABLE ORAL 3 TIMES DAILY PRN
Status: DISCONTINUED | OUTPATIENT
Start: 2023-08-27 | End: 2023-08-28 | Stop reason: HOSPADM

## 2023-08-27 RX ORDER — DIPHENHYDRAMINE HYDROCHLORIDE 50 MG/ML
50 INJECTION INTRAMUSCULAR; INTRAVENOUS ONCE
Status: COMPLETED | OUTPATIENT
Start: 2023-08-27 | End: 2023-08-27

## 2023-08-27 RX ORDER — POTASSIUM CHLORIDE 7.45 MG/ML
40 INJECTION INTRAVENOUS EVERY 4 HOURS
Status: DISCONTINUED | OUTPATIENT
Start: 2023-08-27 | End: 2023-08-27

## 2023-08-27 RX ORDER — TRAZODONE HYDROCHLORIDE 50 MG/1
50 TABLET ORAL NIGHTLY PRN
Status: DISCONTINUED | OUTPATIENT
Start: 2023-08-27 | End: 2023-08-28 | Stop reason: HOSPADM

## 2023-08-27 RX ORDER — POTASSIUM CHLORIDE 20 MEQ/1
40 TABLET, EXTENDED RELEASE ORAL ONCE
Status: COMPLETED | OUTPATIENT
Start: 2023-08-27 | End: 2023-08-27

## 2023-08-27 RX ORDER — GUAIFENESIN/DEXTROMETHORPHAN 100-10MG/5
10 SYRUP ORAL EVERY 6 HOURS PRN
Status: DISCONTINUED | OUTPATIENT
Start: 2023-08-27 | End: 2023-08-28 | Stop reason: HOSPADM

## 2023-08-27 RX ORDER — ACETAMINOPHEN 500 MG
1000 TABLET ORAL EVERY 6 HOURS PRN
Status: DISCONTINUED | OUTPATIENT
Start: 2023-08-27 | End: 2023-08-28 | Stop reason: HOSPADM

## 2023-08-27 RX ORDER — ENOXAPARIN SODIUM 100 MG/ML
40 INJECTION SUBCUTANEOUS EVERY 24 HOURS
Status: DISCONTINUED | OUTPATIENT
Start: 2023-08-27 | End: 2023-08-28 | Stop reason: HOSPADM

## 2023-08-27 RX ADMIN — MUPIROCIN: 20 OINTMENT TOPICAL at 08:08

## 2023-08-27 RX ADMIN — INSULIN HUMAN 0.1 UNITS/KG/HR: 1 INJECTION, SOLUTION INTRAVENOUS at 01:08

## 2023-08-27 RX ADMIN — POTASSIUM CHLORIDE 40 MEQ: 1500 TABLET, EXTENDED RELEASE ORAL at 07:08

## 2023-08-27 RX ADMIN — ENOXAPARIN SODIUM 40 MG: 100 INJECTION SUBCUTANEOUS at 05:08

## 2023-08-27 RX ADMIN — DIPHENHYDRAMINE HYDROCHLORIDE 50 MG: 50 INJECTION, SOLUTION INTRAMUSCULAR; INTRAVENOUS at 01:08

## 2023-08-27 RX ADMIN — ONDANSETRON 8 MG: 2 INJECTION INTRAMUSCULAR; INTRAVENOUS at 11:08

## 2023-08-27 RX ADMIN — SODIUM CHLORIDE 125 ML/HR: 9 INJECTION, SOLUTION INTRAVENOUS at 01:08

## 2023-08-27 RX ADMIN — DEXTROSE MONOHYDRATE 1 G: 5 INJECTION INTRAVENOUS at 02:08

## 2023-08-27 RX ADMIN — MUPIROCIN: 20 OINTMENT TOPICAL at 09:08

## 2023-08-27 RX ADMIN — DEXTROSE AND SODIUM CHLORIDE 125 ML/HR: 5; 450 INJECTION, SOLUTION INTRAVENOUS at 03:08

## 2023-08-27 RX ADMIN — METOCLOPRAMIDE 10 MG: 5 INJECTION, SOLUTION INTRAMUSCULAR; INTRAVENOUS at 01:08

## 2023-08-27 RX ADMIN — INSULIN HUMAN 0.05 UNITS/KG/HR: 1 INJECTION, SOLUTION INTRAVENOUS at 11:08

## 2023-08-27 RX ADMIN — POTASSIUM CHLORIDE 40 MEQ: 7.46 INJECTION, SOLUTION INTRAVENOUS at 07:08

## 2023-08-27 RX ADMIN — FAMOTIDINE 20 MG: 10 INJECTION, SOLUTION INTRAVENOUS at 08:08

## 2023-08-27 RX ADMIN — DEXTROSE AND SODIUM CHLORIDE 125 ML/HR: 5; 450 INJECTION, SOLUTION INTRAVENOUS at 06:08

## 2023-08-27 RX ADMIN — FAMOTIDINE 20 MG: 10 INJECTION, SOLUTION INTRAVENOUS at 09:08

## 2023-08-27 RX ADMIN — DEXTROSE AND SODIUM CHLORIDE 125 ML/HR: 5; 450 INJECTION, SOLUTION INTRAVENOUS at 11:08

## 2023-08-27 NOTE — ASSESSMENT & PLAN NOTE
Elevated white count.  Likely secondary to DKA but was and start empiric antibiotics and follow cultures.

## 2023-08-27 NOTE — H&P
[facility] Hospital Medicine  History & Physical    Patient Name: Mariposa Rivera  MRN: [id number]  Patient Class: IP- Inpatient  Admission Date: 8/27/2023  Attending Physician: Gely Beauchamp MD   Primary Care Provider: Terrence Breaux NP         Patient information was obtained from patient, EMS personnel, past medical records and ER records.     Subjective:     Principal Problem:Type 1 diabetes mellitus with ketoacidosis without coma    Chief Complaint: No chief complaint on file.       HPI: 28 yo F , Type 1 DM since age 6, presents to [facility] with N/V and elevated BS.  She states that she had some nausea earlier that morning and her BS were increased and she noticed that her insulin pump was malfunctioning when she arrived at work and hoped she would be able to wait until she got home to give correctional insulin but began having N/V and some diffuse abdominal pain.  She had some dyspnea and called her mother who is an RN and she came and got her and took her to the ED because she felt that she might be in DKA.      Patient has not been in DKA since 2/26/22 when she had gastroenteritis.  Her AG 36 with glu 440 and WBC 27K most likely from stress margination.  She has no signs of infection or diabetic foot changes.  I will order stat trop and 12 lead EKG and pregnancy test.        Past Medical History:   Diagnosis Date    Type 1 diabetes     diagnosed at age 6       Past Surgical History:   Procedure Laterality Date    OOPHORECTOMY Right        Review of patient's allergies indicates:  No Known Allergies    Current Facility-Administered Medications on File Prior to Encounter   Medication    [COMPLETED] ondansetron injection 4 mg    [COMPLETED] ondansetron injection 4 mg    [COMPLETED] sodium chloride 0.9% bolus 1,000 mL 1,000 mL    [DISCONTINUED] 0.9%  NaCl infusion    [DISCONTINUED] dextrose 10% bolus 125 mL 125 mL    [DISCONTINUED] dextrose 10%  bolus 250 mL 250 mL    [DISCONTINUED] dextrose 5 % and 0.45 % NaCl infusion    [DISCONTINUED] insulin regular in 0.9 % NaCl 100 unit/100 mL (1 unit/mL) infusion    [DISCONTINUED] sodium chloride 0.9% flush 10 mL     Current Outpatient Medications on File Prior to Encounter   Medication Sig    insulin lispro 100 unit/mL injection Inject into the skin 3 (three) times daily before meals.    lisdexamfetamine (VYVANSE) 40 MG Cap Take 40 mg by mouth once daily.     Family History       Problem Relation (Age of Onset)    No Known Problems Mother          Tobacco Use    Smoking status: Never    Smokeless tobacco: Never   Substance and Sexual Activity    Alcohol use: Never    Drug use: Never    Sexual activity: Not on file     Review of Systems   Constitutional:  Negative for appetite change, chills, fatigue, fever and unexpected weight change.   HENT:  Negative for congestion, mouth sores, nosebleeds, sinus pain, sore throat and trouble swallowing.    Respiratory:  Negative for apnea, cough, chest tightness and shortness of breath.    Cardiovascular:  Negative for chest pain, palpitations and leg swelling.   Gastrointestinal:  Positive for abdominal pain, nausea and vomiting. Negative for blood in stool, constipation and diarrhea.   Genitourinary:  Negative for decreased urine volume, difficulty urinating, dysuria and frequency.   Musculoskeletal:  Negative for arthralgias, back pain and neck pain.   Skin:  Negative for rash.   Neurological:  Negative for syncope, light-headedness and headaches.   Hematological:  Does not bruise/bleed easily.   Psychiatric/Behavioral:  Negative for confusion and suicidal ideas.      Objective:     Vital Signs (Most Recent):  Temp: 98.5 °F (36.9 °C) (08/27/23 0101)  Pulse: (!) 125 (08/27/23 0112)  Resp: (!) 28 (08/27/23 0112)  BP: 137/86 (08/27/23 0115)  SpO2: 100 % (08/27/23 0112) Vital Signs (24h Range):  Temp:  [98.2 °F (36.8 °C)-98.5 °F (36.9 °C)] 98.5 °F (36.9 °C)  Pulse:   [125-134] 125  Resp:  [28] 28  SpO2:  [99 %-100 %] 100 %  BP: (137-144)/(86-87) 137/86     Weight: 84.2 kg (185 lb 9.6 oz)  Body mass index is 30.89 kg/m².     Physical Exam  Constitutional:       General: She is not in acute distress.     Appearance: She is ill-appearing. She is not toxic-appearing or diaphoretic.   HENT:      Head: Atraumatic.      Mouth/Throat:      Mouth: Mucous membranes are dry.      Pharynx: Oropharynx is clear.   Eyes:      Conjunctiva/sclera: Conjunctivae normal.      Pupils: Pupils are equal, round, and reactive to light.   Neck:      Vascular: No carotid bruit.   Cardiovascular:      Rate and Rhythm: Regular rhythm. Tachycardia present.      Pulses: Normal pulses.      Heart sounds: Normal heart sounds.   Pulmonary:      Effort: Pulmonary effort is normal.      Breath sounds: Normal breath sounds.   Abdominal:      General: Abdomen is flat. Bowel sounds are normal. There is no distension.      Palpations: Abdomen is soft.      Tenderness: There is no abdominal tenderness. There is no guarding.   Musculoskeletal:         General: No tenderness, deformity or signs of injury. Normal range of motion.      Cervical back: Neck supple.      Right lower leg: No edema.      Left lower leg: No edema.   Skin:     General: Skin is warm and dry.      Capillary Refill: Capillary refill takes less than 2 seconds.      Coloration: Skin is not jaundiced or pale.      Findings: No bruising, lesion or rash.   Neurological:      General: No focal deficit present.      Mental Status: She is alert and oriented to person, place, and time.   Psychiatric:         Mood and Affect: Mood normal.              CRANIAL NERVES     CN III, IV, VI   Pupils are equal, round, and reactive to light.       Significant Labs: All pertinent labs within the past 24 hours have been reviewed.    Significant Imaging: I have reviewed all pertinent imaging results/findings within the past 24 hours.    Assessment/Plan:     * Type 1  "diabetes mellitus with ketoacidosis without coma  Patient's FSGs are uncontrolled due to hyperglycemia on current medication regimen.  Last A1c reviewed-   Lab Results   Component Value Date    HGBA1C 8.9 (H) 08/26/2023     Most recent fingerstick glucose reviewed- No results for input(s): "POCTGLUCOSE" in the last 24 hours.  Current correctional scale  High  Increase anti-hyperglycemic dose as follows-   Antihyperglycemics (From admission, onward)    Start     Stop Route Frequency Ordered    08/27/23 0200  insulin regular in 0.9 % NaCl 100 unit/100 mL (1 unit/mL) infusion        Question Answer Comment   Initial dose (DO NOT CHANGE): 0.1 units/kg/hr    Insulin Rate Adjustment (DO NOT MODIFY ANSWER) \\[email or web address]\epic\Images\Pharmacy\InsulinInfusions\INSULIN ADJUSTMENT DKA version [id number]        -- IV Continuous 08/27/23 0145        Volume resuscitation     JERRY (acute kidney injury)  Patient with acute kidney injury/acute renal failure likely due to pre-renal azotemia due to dehydration JERRY is currently stable. Baseline creatinine 0.9 - Labs reviewed- Renal function/electrolytes with Estimated Creatinine Clearance: 57.6 mL/min (A) (based on SCr of 1.6 mg/dL (H)). according to latest data. Monitor urine output and serial BMP and adjust therapy as needed. Avoid nephrotoxins and renally dose meds for GFR listed above.    Volume resuscitation and electrolyte replacement as indicated.    Insulin infusion and close monitoring in ICU per protocol.        VTE Risk Mitigation (From admission, onward)         Ordered     enoxaparin injection 40 mg  Daily         08/27/23 0145     IP VTE HIGH RISK PATIENT  Once         08/27/23 0145     Place DELORIS hose  Until discontinued         08/27/23 0145                           Gely Beauchamp MD  Department of Hospital Medicine  [facility]  "

## 2023-08-27 NOTE — ASSESSMENT & PLAN NOTE
Secondary to pump failure  Continue DKA protocol, IVFs, labs  Treat nausea/vomting     Family to bring new pump and supplies for when her gap closes and she is able to eat a diet.

## 2023-08-27 NOTE — ASSESSMENT & PLAN NOTE
"Patient's FSGs are uncontrolled due to hyperglycemia on current medication regimen.  Last A1c reviewed-   Lab Results   Component Value Date    HGBA1C 8.9 (H) 08/26/2023     Most recent fingerstick glucose reviewed- No results for input(s): "POCTGLUCOSE" in the last 24 hours.  Current correctional scale  High  Increase anti-hyperglycemic dose as follows-   Antihyperglycemics (From admission, onward)    Start     Stop Route Frequency Ordered    08/27/23 0200  insulin regular in 0.9 % NaCl 100 unit/100 mL (1 unit/mL) infusion        Question Answer Comment   Initial dose (DO NOT CHANGE): 0.1 units/kg/hr    Insulin Rate Adjustment (DO NOT MODIFY ANSWER) [email or web address]\[email or web address]\Images\Pharmacy\InsulinInfusions\INSULIN ADJUSTMENT DKA version [id number]        -- IV Continuous 08/27/23 0145        Volume resuscitation   "

## 2023-08-27 NOTE — HPI
28 yo F , Type 1 DM since age 6, presents to [facility] with N/V and elevated BS.  She states that she had some nausea earlier that morning and her BS were increased and she noticed that her insulin pump was malfunctioning when she arrived at work and hoped she would be able to wait until she got home to give correctional insulin but began having N/V and some diffuse abdominal pain.  She had some dyspnea and called her mother who is an RN and she came and got her and took her to the ED because she felt that she might be in DKA.      Patient has not been in DKA since 2/26/22 when she had gastroenteritis.  Her AG 36 with glu 440 and WBC 27K most likely from stress margination.  She has no signs of infection or diabetic foot changes.  I will order stat trop and 12 lead EKG and pregnancy test.

## 2023-08-27 NOTE — PLAN OF CARE
Problem: Adult Inpatient Plan of Care  Goal: Absence of Hospital-Acquired Illness or Injury  Outcome: Ongoing, Progressing  Intervention: Prevent Infection  Flowsheets (Taken 8/27/2023 0119)  Infection Prevention:   hand hygiene promoted   rest/sleep promoted     Problem: Diabetes Comorbidity  Goal: Blood Glucose Level Within Targeted Range  Outcome: Ongoing, Progressing  Intervention: Monitor and Manage Glycemia  Flowsheets (Taken 8/27/2023 0119)  Glycemic Management: blood glucose monitored

## 2023-08-27 NOTE — PROGRESS NOTES
[facility]  Pulmonology  Progress Note    Patient Name: Mariposa Rivera  MRN: [id number]  Admission Date: 8/27/2023  Hospital Length of Stay: 0 days  Code Status: Full Code  Attending Provider: Gely Beauchamp MD  Primary Care Provider: Terrence Breaux NP   Principal Problem: Type 1 diabetes mellitus with ketoacidosis without coma    Subjective:     Interval History:  still c/o nausea.       Objective:     Vital Signs (Most Recent):  Temp: 98.1 °F (36.7 °C) (08/27/23 0345)  Pulse: 98 (08/27/23 1130)  Resp: (!) 25 (08/27/23 1130)  BP: 119/69 (08/27/23 1130)  SpO2: 98 % (08/27/23 1130) Vital Signs (24h Range):  Temp:  [98.1 °F (36.7 °C)-98.5 °F (36.9 °C)] 98.1 °F (36.7 °C)  Pulse:  [] 98  Resp:  [15-30] 25  SpO2:  [97 %-100 %] 98 %  BP: (114-162)/(59-99) 119/69     Weight: 84.2 kg (185 lb 9.6 oz)  Body mass index is 30.89 kg/m².      Intake/Output Summary (Last 24 hours) at 8/27/2023 1302  Last data filed at 8/27/2023 1100  Gross per 24 hour   Intake 1207.16 ml   Output --   Net 1207.16 ml        Physical Exam  Vitals reviewed.   Constitutional:       Appearance: She is ill-appearing.   HENT:      Right Ear: External ear normal.      Left Ear: External ear normal.      Mouth/Throat:      Mouth: Mucous membranes are dry.      Pharynx: Oropharynx is clear.   Eyes:      Extraocular Movements: Extraocular movements intact.      Conjunctiva/sclera: Conjunctivae normal.   Cardiovascular:      Rate and Rhythm: Normal rate and regular rhythm.   Pulmonary:      Effort: Pulmonary effort is normal.      Breath sounds: Normal breath sounds.   Abdominal:      General: Bowel sounds are normal.      Palpations: Abdomen is soft.   Musculoskeletal:         General: Normal range of motion.      Cervical back: Normal range of motion.   Skin:     General: Skin is warm and dry.      Capillary Refill: Capillary refill takes less than 2 seconds.   Neurological:      General: No focal deficit present.       Mental Status: She is alert and oriented to person, place, and time. Mental status is at baseline.   Psychiatric:         Mood and Affect: Mood normal.           Review of Systems    Vents:  Oxygen Concentration (%): 21 (08/27/23 0730)    Lines/Drains/Airways       Peripheral Intravenous Line  Duration                  Peripheral IV - Single Lumen 08/27/23 0234 20 G Anterior;Left Forearm <1 day         Peripheral IV - Single Lumen 08/27/23 20 G Right Antecubital <1 day                    Significant Labs:    CBC/Anemia Profile:  Recent Labs   Lab 08/26/23 2112   WBC 27.30*   HGB 16.3*   HCT 48.5*   *   MCV 82.3   RDW 13.4        Chemistries:  Recent Labs   Lab 08/26/23 2112 08/27/23  0224 08/27/23  1119    140 138   K 4.6 4.4 3.6   CL 95* 107 111*   CO2 10* 10* 17*   BUN 13 11 9   CREATININE 1.60* 1.27* 1.15*   CALCIUM 10.6* 9.3 9.1   ALBUMIN 4.9  --   --    PROT 8.6*  --   --    BILITOT 0.8  --   --    ALKPHOS 137*  --   --    ALT 34  --   --    AST 18  --   --    MG 1.8 2.0  --    PHOS 6.1* 3.9  --        All pertinent labs within the past 24 hours have been reviewed.    Significant Imaging:  I have reviewed all pertinent imaging results/findings within the past 24 hours.    Assessment/Plan:     Renal/  JERRY (acute kidney injury)  Improving with IVFs       Oncology  Leukocytosis  Elevated white count.  Likely secondary to DKA but was and start empiric antibiotics and follow cultures.    Endocrine  * Type 1 diabetes mellitus with ketoacidosis without coma  Secondary to pump failure  Continue DKA protocol, IVFs, labs  Treat nausea/vomting     Family to bring new pump and supplies for when her gap closes and she is able to eat a diet.       This includes 32 minutes of critical care time spent evaluating and managing patient. This includes time spent at bedside, reviewing labs, data, xrays and monitoring for acute decompensation.              Soledad Preston, AG-ACNP  Pulmonology  [facility]

## 2023-08-27 NOTE — PLAN OF CARE
Problem: Diabetes Comorbidity  Goal: Blood Glucose Level Within Targeted Range  Intervention: Monitor and Manage Glycemia  Flowsheets (Taken 8/27/2023 1641)  Glycemic Management:   blood glucose monitored   insulin infusion adjusted     Problem: Diabetic Ketoacidosis  Goal: Fluid and Electrolyte Balance with Absence of Ketosis  Intervention: Monitor and Manage Ketoacidosis  Flowsheets (Taken 8/27/2023 1641)  Fluid/Electrolyte Management: fluids provided  Glycemic Management:   blood glucose monitored   insulin infusion adjusted     Problem: Fluid and Electrolyte Imbalance (Acute Kidney Injury/Impairment)  Goal: Fluid and Electrolyte Balance  Intervention: Monitor and Manage Fluid and Electrolyte Balance  Flowsheets (Taken 8/27/2023 1641)  Fluid/Electrolyte Management: fluids provided     Problem: Oral Intake Inadequate (Acute Kidney Injury/Impairment)  Goal: Optimal Nutrition Intake  Intervention: Promote and Optimize Nutrition  Flowsheets (Taken 8/27/2023 1641)  Oral Nutrition Promotion: rest periods promoted

## 2023-08-27 NOTE — ED TRIAGE NOTES
Patient arrived via private vehicle, with c/o N/V all day hx of dm1, increased resp and elevated bs.

## 2023-08-27 NOTE — SUBJECTIVE & OBJECTIVE
Past Medical History:   Diagnosis Date    Type 1 diabetes     diagnosed at age 6       Past Surgical History:   Procedure Laterality Date    OOPHORECTOMY Right        Review of patient's allergies indicates:  No Known Allergies    Current Facility-Administered Medications on File Prior to Encounter   Medication    [COMPLETED] ondansetron injection 4 mg    [COMPLETED] ondansetron injection 4 mg    [COMPLETED] sodium chloride 0.9% bolus 1,000 mL 1,000 mL    [DISCONTINUED] 0.9%  NaCl infusion    [DISCONTINUED] dextrose 10% bolus 125 mL 125 mL    [DISCONTINUED] dextrose 10% bolus 250 mL 250 mL    [DISCONTINUED] dextrose 5 % and 0.45 % NaCl infusion    [DISCONTINUED] insulin regular in 0.9 % NaCl 100 unit/100 mL (1 unit/mL) infusion    [DISCONTINUED] sodium chloride 0.9% flush 10 mL     Current Outpatient Medications on File Prior to Encounter   Medication Sig    insulin lispro 100 unit/mL injection Inject into the skin 3 (three) times daily before meals.    lisdexamfetamine (VYVANSE) 40 MG Cap Take 40 mg by mouth once daily.     Family History       Problem Relation (Age of Onset)    No Known Problems Mother          Tobacco Use    Smoking status: Never    Smokeless tobacco: Never   Substance and Sexual Activity    Alcohol use: Never    Drug use: Never    Sexual activity: Not on file     Review of Systems   Constitutional:  Negative for appetite change, chills, fatigue, fever and unexpected weight change.   HENT:  Negative for congestion, mouth sores, nosebleeds, sinus pain, sore throat and trouble swallowing.    Respiratory:  Negative for apnea, cough, chest tightness and shortness of breath.    Cardiovascular:  Negative for chest pain, palpitations and leg swelling.   Gastrointestinal:  Positive for abdominal pain, nausea and vomiting. Negative for blood in stool, constipation and diarrhea.   Genitourinary:  Negative for decreased urine volume, difficulty urinating, dysuria and frequency.   Musculoskeletal:  Negative  for arthralgias, back pain and neck pain.   Skin:  Negative for rash.   Neurological:  Negative for syncope, light-headedness and headaches.   Hematological:  Does not bruise/bleed easily.   Psychiatric/Behavioral:  Negative for confusion and suicidal ideas.      Objective:     Vital Signs (Most Recent):  Temp: 98.5 °F (36.9 °C) (08/27/23 0101)  Pulse: (!) 125 (08/27/23 0112)  Resp: (!) 28 (08/27/23 0112)  BP: 137/86 (08/27/23 0115)  SpO2: 100 % (08/27/23 0112) Vital Signs (24h Range):  Temp:  [98.2 °F (36.8 °C)-98.5 °F (36.9 °C)] 98.5 °F (36.9 °C)  Pulse:  [125-134] 125  Resp:  [28] 28  SpO2:  [99 %-100 %] 100 %  BP: (137-144)/(86-87) 137/86     Weight: 84.2 kg (185 lb 9.6 oz)  Body mass index is 30.89 kg/m².     Physical Exam  Constitutional:       General: She is not in acute distress.     Appearance: She is ill-appearing. She is not toxic-appearing or diaphoretic.   HENT:      Head: Atraumatic.      Mouth/Throat:      Mouth: Mucous membranes are dry.      Pharynx: Oropharynx is clear.   Eyes:      Conjunctiva/sclera: Conjunctivae normal.      Pupils: Pupils are equal, round, and reactive to light.   Neck:      Vascular: No carotid bruit.   Cardiovascular:      Rate and Rhythm: Regular rhythm. Tachycardia present.      Pulses: Normal pulses.      Heart sounds: Normal heart sounds.   Pulmonary:      Effort: Pulmonary effort is normal.      Breath sounds: Normal breath sounds.   Abdominal:      General: Abdomen is flat. Bowel sounds are normal. There is no distension.      Palpations: Abdomen is soft.      Tenderness: There is no abdominal tenderness. There is no guarding.   Musculoskeletal:         General: No tenderness, deformity or signs of injury. Normal range of motion.      Cervical back: Neck supple.      Right lower leg: No edema.      Left lower leg: No edema.   Skin:     General: Skin is warm and dry.      Capillary Refill: Capillary refill takes less than 2 seconds.      Coloration: Skin is not  jaundiced or pale.      Findings: No bruising, lesion or rash.   Neurological:      General: No focal deficit present.      Mental Status: She is alert and oriented to person, place, and time.   Psychiatric:         Mood and Affect: Mood normal.              CRANIAL NERVES     CN III, IV, VI   Pupils are equal, round, and reactive to light.       Significant Labs: All pertinent labs within the past 24 hours have been reviewed.    Significant Imaging: I have reviewed all pertinent imaging results/findings within the past 24 hours.

## 2023-08-27 NOTE — ED NOTES
Ambulance arrived to transfer patient to [facility] in [place], Report called to Dangelo Rn in ICU, password is [place]. Family at bedside and is aware of status.

## 2023-08-27 NOTE — HPI
26 yo F , Type 1 DM since age 6, presents to [facility] with N/V and elevated BS.  She states that she had some nausea earlier that morning and her BS were increased and she noticed that her insulin pump was malfunctioning when she arrived at work and hoped she would be able to wait until she got home to give correctional insulin but began having N/V and some diffuse abdominal pain.  She had some dyspnea and called her mother who is an RN and she came and got her and took her to the ED because she felt that she might be in DKA.

## 2023-08-27 NOTE — ED PROVIDER NOTES
Encounter Date: 8/26/2023       History     Chief Complaint   Patient presents with    Hyperglycemia     N/V all day     Ms Rivera is a 27 yr old WF with PMH of T1DM to ED with c/o N/V all day.      The history is provided by the patient.   Emesis   This is a new problem. The current episode started yesterday. The problem occurs intermittently. The problem has been waxing and waning. The emesis has an appearance of stomach contents. Associated symptoms include myalgias.     Review of patient's allergies indicates:  No Known Allergies  Past Medical History:   Diagnosis Date    Diabetes mellitus      Past Surgical History:   Procedure Laterality Date    OOPHORECTOMY Right      Family History   Problem Relation Age of Onset    No Known Problems Mother      Social History     Tobacco Use    Smoking status: Never    Smokeless tobacco: Never   Substance Use Topics    Alcohol use: Never    Drug use: Never     Review of Systems   Constitutional:  Positive for appetite change and fatigue.   Respiratory: Negative.     Cardiovascular: Negative.    Gastrointestinal:  Positive for nausea and vomiting.   Genitourinary: Negative.    Musculoskeletal:  Positive for myalgias.   Skin: Negative.        Physical Exam     Initial Vitals [08/26/23 2121]   BP Pulse Resp Temp SpO2   (!) 144/87 (!) 134 (!) 28 98.2 °F (36.8 °C) 99 %      MAP       --         Physical Exam    Nursing note and vitals reviewed.  Constitutional: She appears well-developed and well-nourished. She is cooperative. She appears ill.   Cardiovascular:  Regular rhythm.   Tachycardia present.         Pulmonary/Chest: Breath sounds normal. Tachypnea noted.   Abdominal: Abdomen is soft. There is no abdominal tenderness.     Neurological: She is alert and oriented to person, place, and time.   Skin: Skin is warm and dry.         Medical Screening Exam   See Full Note    ED Course   Procedures  Labs Reviewed   COMPREHENSIVE METABOLIC PANEL - Abnormal; Notable for the  following components:       Result Value    Chloride 95 (*)     CO2 10 (*)     Anion Gap 36 (*)     Glucose 440 (*)     Creatinine 1.60 (*)     Calcium 10.6 (*)     Total Protein 8.6 (*)     Alk Phos 137 (*)     eGFR 46 (*)     All other components within normal limits   URINALYSIS, REFLEX TO URINE CULTURE - Abnormal; Notable for the following components:    Protein,  (*)     Glucose,  (*)     Ketones, UA >=160 (*)     Bilirubin, UA Small (*)     Blood, UA Moderate (*)     All other components within normal limits   LACTIC ACID, PLASMA - Abnormal; Notable for the following components:    Lactic Acid 2.6 (*)     All other components within normal limits   CBC WITH DIFFERENTIAL - Abnormal; Notable for the following components:    WBC 27.30 (*)     RBC 5.89 (*)     Hemoglobin 16.3 (*)     Hematocrit 48.5 (*)     Platelet Count 497 (*)     Neutrophils % 88.9 (*)     Lymphocytes % 5.2 (*)     Neutrophils, Abs 24.26 (*)     Eosinophils % 0.0 (*)     Monocytes, Absolute 1.57 (*)     All other components within normal limits   URINALYSIS, MICROSCOPIC - Abnormal; Notable for the following components:    Bacteria, UA Few (*)     Squamous Epithelial Cells, UA Many (*)     All other components within normal limits    Narrative:     MODERATE CLUE CELLS PRESENT   HEMOGLOBIN A1C - Abnormal; Notable for the following components:    Hemoglobin A1C 8.9 (*)     All other components within normal limits   PHOSPHORUS - Abnormal; Notable for the following components:    Phosphorus 6.1 (*)     All other components within normal limits   POCT GLUCOSE MONITORING CONTINUOUS - Abnormal; Notable for the following components:    POC Glucose 448 (*)     All other components within normal limits   MAGNESIUM - Normal   CBC W/ AUTO DIFFERENTIAL    Narrative:     The following orders were created for panel order CBC auto differential.  Procedure                               Abnormality         Status                     ---------                                -----------         ------                     CBC with Differential[[id number]]        Abnormal            Final result               Manual Differential[[id number]]                                                           Please view results for these tests on the individual orders.   LACTIC ACID, PLASMA   BASIC METABOLIC PANEL   PHOSPHORUS   POCT GLUCOSE MONITORING CONTINUOUS   POCT GLUCOSE MONITORING CONTINUOUS          Imaging Results              X-Ray Chest AP Portable (In process)  Result time 08/26/23 21:09:07                     Medications   sodium chloride 0.9% flush 10 mL (has no administration in time range)   dextrose 5 % and 0.45 % NaCl infusion (has no administration in time range)   0.9%  NaCl infusion (1,000 mLs Intravenous New Bag 8/26/23 2246)   insulin regular in 0.9 % NaCl 100 unit/100 mL (1 unit/mL) infusion (0.05 Units/kg/hr × 69.9 kg Intravenous New Bag 8/26/23 2246)   dextrose 10% bolus 125 mL 125 mL (has no administration in time range)   dextrose 10% bolus 250 mL 250 mL (has no administration in time range)   sodium chloride 0.9% bolus 1,000 mL 1,000 mL (0 mLs Intravenous Stopped 8/26/23 2231)   ondansetron injection 4 mg (4 mg Intravenous Given 8/26/23 2130)   ondansetron injection 4 mg (4 mg Intravenous Given 8/26/23 2342)     Medical Decision Making  Ms Rivera is a 27 yr old WF with PMH of T1DM to ED with c/o N/V all day.  Pt states she had gi symptoms earlier this week that resolved.  States hasn't been able to eat today.  Pt last DKA >1 year ago.    Amount and/or Complexity of Data Reviewed  Labs: ordered. Decision-making details documented in ED Course.     Details: DKA with pH 7.09  Radiology: ordered.     Details: No acute cardiopulmonary change  ECG/medicine tests: ordered and independent interpretation performed.     Details: Sinus tachycardia  Discussion of management or test interpretation with external provider(s): Telemed consult via audio / video with  Dr Rojas, who recommends transfer to facility with ICU.  Consult with Dr. Beauchamp.      Risk  Prescription drug management.               ED Course as of 08/26/23 2343   Sat Aug 26, 2023   2341 WBC(!!): 27.30  Reports vomiting and diarrhea earlier this week but denies recent infeciton [CG]   2342 POC PH(!!): 7.09 [CG]   2342 Glucose(!): 440 [CG]   2342 CO2(!): 10 [CG]   2342 Ketones, UA(!): >=160 [CG]      ED Course User Index  [CG] Rosie Hobbs FNP                    Clinical Impression:   Final diagnoses:  [R73.9] Hyperglycemia  [E10.10] Diabetic ketoacidosis without coma associated with type 1 diabetes mellitus (Primary)  [D72.829] Leukocytosis, unspecified type        ED Disposition Condition    Transfer to Another Facility Stable                Rosie Hobbs FNP  08/26/23 2343

## 2023-08-27 NOTE — SUBJECTIVE & OBJECTIVE
Interval History:  still c/o nausea.       Objective:     Vital Signs (Most Recent):  Temp: 98.1 °F (36.7 °C) (08/27/23 0345)  Pulse: 98 (08/27/23 1130)  Resp: (!) 25 (08/27/23 1130)  BP: 119/69 (08/27/23 1130)  SpO2: 98 % (08/27/23 1130) Vital Signs (24h Range):  Temp:  [98.1 °F (36.7 °C)-98.5 °F (36.9 °C)] 98.1 °F (36.7 °C)  Pulse:  [] 98  Resp:  [15-30] 25  SpO2:  [97 %-100 %] 98 %  BP: (114-162)/(59-99) 119/69     Weight: 84.2 kg (185 lb 9.6 oz)  Body mass index is 30.89 kg/m².      Intake/Output Summary (Last 24 hours) at 8/27/2023 1302  Last data filed at 8/27/2023 1100  Gross per 24 hour   Intake 1207.16 ml   Output --   Net 1207.16 ml        Physical Exam  Vitals reviewed.   Constitutional:       Appearance: She is ill-appearing.   HENT:      Right Ear: External ear normal.      Left Ear: External ear normal.      Mouth/Throat:      Mouth: Mucous membranes are dry.      Pharynx: Oropharynx is clear.   Eyes:      Extraocular Movements: Extraocular movements intact.      Conjunctiva/sclera: Conjunctivae normal.   Cardiovascular:      Rate and Rhythm: Normal rate and regular rhythm.   Pulmonary:      Effort: Pulmonary effort is normal.      Breath sounds: Normal breath sounds.   Abdominal:      General: Bowel sounds are normal.      Palpations: Abdomen is soft.   Musculoskeletal:         General: Normal range of motion.      Cervical back: Normal range of motion.   Skin:     General: Skin is warm and dry.      Capillary Refill: Capillary refill takes less than 2 seconds.   Neurological:      General: No focal deficit present.      Mental Status: She is alert and oriented to person, place, and time. Mental status is at baseline.   Psychiatric:         Mood and Affect: Mood normal.           Review of Systems    Vents:  Oxygen Concentration (%): 21 (08/27/23 0730)    Lines/Drains/Airways       Peripheral Intravenous Line  Duration                  Peripheral IV - Single Lumen 08/27/23 0234 20 G  Anterior;Left Forearm <1 day         Peripheral IV - Single Lumen 08/27/23 20 G Right Antecubital <1 day                    Significant Labs:    CBC/Anemia Profile:  Recent Labs   Lab 08/26/23 2112   WBC 27.30*   HGB 16.3*   HCT 48.5*   *   MCV 82.3   RDW 13.4        Chemistries:  Recent Labs   Lab 08/26/23 2112 08/27/23 0224 08/27/23  1119    140 138   K 4.6 4.4 3.6   CL 95* 107 111*   CO2 10* 10* 17*   BUN 13 11 9   CREATININE 1.60* 1.27* 1.15*   CALCIUM 10.6* 9.3 9.1   ALBUMIN 4.9  --   --    PROT 8.6*  --   --    BILITOT 0.8  --   --    ALKPHOS 137*  --   --    ALT 34  --   --    AST 18  --   --    MG 1.8 2.0  --    PHOS 6.1* 3.9  --        All pertinent labs within the past 24 hours have been reviewed.    Significant Imaging:  I have reviewed all pertinent imaging results/findings within the past 24 hours.

## 2023-08-27 NOTE — ASSESSMENT & PLAN NOTE
Patient with acute kidney injury/acute renal failure likely due to pre-renal azotemia due to dehydration JERRY is currently stable. Baseline creatinine 0.9 - Labs reviewed- Renal function/electrolytes with Estimated Creatinine Clearance: 57.6 mL/min (A) (based on SCr of 1.6 mg/dL (H)). according to latest data. Monitor urine output and serial BMP and adjust therapy as needed. Avoid nephrotoxins and renally dose meds for GFR listed above.    Volume resuscitation and electrolyte replacement as indicated.    Insulin infusion and close monitoring in ICU per protocol.

## 2023-08-28 VITALS
DIASTOLIC BLOOD PRESSURE: 63 MMHG | SYSTOLIC BLOOD PRESSURE: 114 MMHG | TEMPERATURE: 98 F | HEIGHT: 65 IN | RESPIRATION RATE: 13 BRPM | WEIGHT: 183.19 LBS | BODY MASS INDEX: 30.52 KG/M2 | OXYGEN SATURATION: 97 % | HEART RATE: 68 BPM

## 2023-08-28 PROBLEM — N17.9 AKI (ACUTE KIDNEY INJURY): Status: RESOLVED | Noted: 2023-08-27 | Resolved: 2023-08-28

## 2023-08-28 PROBLEM — D72.829 LEUKOCYTOSIS: Status: RESOLVED | Noted: 2023-08-27 | Resolved: 2023-08-28

## 2023-08-28 PROBLEM — E10.10 DIABETIC KETOACIDOSIS WITHOUT COMA ASSOCIATED WITH TYPE 1 DIABETES MELLITUS: Status: RESOLVED | Noted: 2023-08-27 | Resolved: 2023-08-28

## 2023-08-28 PROBLEM — R00.0 TACHYCARDIA: Status: RESOLVED | Noted: 2023-08-27 | Resolved: 2023-08-28

## 2023-08-28 PROBLEM — E10.9 DM (DIABETES MELLITUS), TYPE 1: Status: ACTIVE | Noted: 2023-08-28

## 2023-08-28 PROBLEM — E10.10 TYPE 1 DIABETES MELLITUS WITH KETOACIDOSIS WITHOUT COMA: Status: RESOLVED | Noted: 2023-08-27 | Resolved: 2023-08-28

## 2023-08-28 LAB
ANION GAP SERPL CALCULATED.3IONS-SCNC: 10 MMOL/L (ref 7–16)
ANION GAP SERPL CALCULATED.3IONS-SCNC: 11 MMOL/L (ref 7–16)
ANION GAP SERPL CALCULATED.3IONS-SCNC: 11 MMOL/L (ref 7–16)
BASOPHILS # BLD AUTO: 0.03 K/UL (ref 0–0.2)
BASOPHILS NFR BLD AUTO: 0.4 % (ref 0–1)
BUN SERPL-MCNC: 6 MG/DL (ref 7–18)
BUN SERPL-MCNC: 7 MG/DL (ref 7–18)
BUN SERPL-MCNC: 7 MG/DL (ref 7–18)
BUN/CREAT SERPL: 8 (ref 6–20)
CALCIUM SERPL-MCNC: 8.4 MG/DL (ref 8.5–10.1)
CALCIUM SERPL-MCNC: 8.5 MG/DL (ref 8.5–10.1)
CALCIUM SERPL-MCNC: 8.8 MG/DL (ref 8.5–10.1)
CHLORIDE SERPL-SCNC: 111 MMOL/L (ref 98–107)
CHLORIDE SERPL-SCNC: 112 MMOL/L (ref 98–107)
CHLORIDE SERPL-SCNC: 115 MMOL/L (ref 98–107)
CO2 SERPL-SCNC: 19 MMOL/L (ref 21–32)
CO2 SERPL-SCNC: 20 MMOL/L (ref 21–32)
CO2 SERPL-SCNC: 20 MMOL/L (ref 21–32)
CREAT SERPL-MCNC: 0.73 MG/DL (ref 0.55–1.02)
CREAT SERPL-MCNC: 0.84 MG/DL (ref 0.55–1.02)
CREAT SERPL-MCNC: 0.9 MG/DL (ref 0.55–1.02)
DIFFERENTIAL METHOD BLD: ABNORMAL
EGFR (NO RACE VARIABLE) (RUSH/TITUS): 117 ML/MIN/1.73M2
EGFR (NO RACE VARIABLE) (RUSH/TITUS): 91 ML/MIN/1.73M2
EGFR (NO RACE VARIABLE) (RUSH/TITUS): 99 ML/MIN/1.73M2
EOSINOPHIL # BLD AUTO: 0.09 K/UL (ref 0–0.5)
EOSINOPHIL NFR BLD AUTO: 1.3 % (ref 1–4)
ERYTHROCYTE [DISTWIDTH] IN BLOOD BY AUTOMATED COUNT: 13.4 % (ref 11.5–14.5)
GLUCOSE SERPL-MCNC: 108 MG/DL (ref 70–105)
GLUCOSE SERPL-MCNC: 119 MG/DL (ref 70–105)
GLUCOSE SERPL-MCNC: 136 MG/DL (ref 70–105)
GLUCOSE SERPL-MCNC: 137 MG/DL (ref 74–106)
GLUCOSE SERPL-MCNC: 139 MG/DL (ref 70–105)
GLUCOSE SERPL-MCNC: 140 MG/DL (ref 70–105)
GLUCOSE SERPL-MCNC: 148 MG/DL (ref 70–105)
GLUCOSE SERPL-MCNC: 152 MG/DL (ref 70–105)
GLUCOSE SERPL-MCNC: 159 MG/DL (ref 70–105)
GLUCOSE SERPL-MCNC: 167 MG/DL (ref 70–105)
GLUCOSE SERPL-MCNC: 169 MG/DL (ref 70–105)
GLUCOSE SERPL-MCNC: 192 MG/DL (ref 70–105)
GLUCOSE SERPL-MCNC: 255 MG/DL (ref 70–105)
GLUCOSE SERPL-MCNC: 261 MG/DL (ref 74–106)
GLUCOSE SERPL-MCNC: 264 MG/DL (ref 70–105)
GLUCOSE SERPL-MCNC: 349 MG/DL (ref 70–105)
GLUCOSE SERPL-MCNC: 99 MG/DL (ref 74–106)
HCT VFR BLD AUTO: 39.3 % (ref 38–47)
HGB BLD-MCNC: 13.3 G/DL (ref 12–16)
IMM GRANULOCYTES # BLD AUTO: 0.02 K/UL (ref 0–0.04)
IMM GRANULOCYTES NFR BLD: 0.3 % (ref 0–0.4)
LYMPHOCYTES # BLD AUTO: 2.54 K/UL (ref 1–4.8)
LYMPHOCYTES NFR BLD AUTO: 35.6 % (ref 27–41)
MAGNESIUM SERPL-MCNC: 2 MG/DL (ref 1.7–2.3)
MCH RBC QN AUTO: 27.6 PG (ref 27–31)
MCHC RBC AUTO-ENTMCNC: 33.8 G/DL (ref 32–36)
MCV RBC AUTO: 81.5 FL (ref 80–96)
MONOCYTES # BLD AUTO: 0.65 K/UL (ref 0–0.8)
MONOCYTES NFR BLD AUTO: 9.1 % (ref 2–6)
MPC BLD CALC-MCNC: 10.8 FL (ref 9.4–12.4)
NEUTROPHILS # BLD AUTO: 3.8 K/UL (ref 1.8–7.7)
NEUTROPHILS NFR BLD AUTO: 53.3 % (ref 53–65)
NRBC # BLD AUTO: 0 X10E3/UL
NRBC, AUTO (.00): 0 %
PHOSPHATE SERPL-MCNC: 2 MG/DL (ref 2.5–4.5)
PLATELET # BLD AUTO: 303 K/UL (ref 150–400)
POTASSIUM SERPL-SCNC: 3.3 MMOL/L (ref 3.5–5.1)
POTASSIUM SERPL-SCNC: 3.4 MMOL/L (ref 3.5–5.1)
POTASSIUM SERPL-SCNC: 3.8 MMOL/L (ref 3.5–5.1)
RBC # BLD AUTO: 4.82 M/UL (ref 4.2–5.4)
SODIUM SERPL-SCNC: 138 MMOL/L (ref 136–145)
SODIUM SERPL-SCNC: 140 MMOL/L (ref 136–145)
SODIUM SERPL-SCNC: 141 MMOL/L (ref 136–145)
WBC # BLD AUTO: 7.13 K/UL (ref 4.5–11)

## 2023-08-28 PROCEDURE — 99232 SBSQ HOSP IP/OBS MODERATE 35: CPT | Mod: ,,, | Performed by: INTERNAL MEDICINE

## 2023-08-28 PROCEDURE — 25000003 PHARM REV CODE 250: Performed by: NURSE PRACTITIONER

## 2023-08-28 PROCEDURE — 99232 PR SUBSEQUENT HOSPITAL CARE,LEVL II: ICD-10-PCS | Mod: ,,, | Performed by: INTERNAL MEDICINE

## 2023-08-28 PROCEDURE — 80048 BASIC METABOLIC PNL TOTAL CA: CPT | Performed by: HOSPITALIST

## 2023-08-28 PROCEDURE — 84100 ASSAY OF PHOSPHORUS: CPT | Performed by: HOSPITALIST

## 2023-08-28 PROCEDURE — S5010 5% DEXTROSE AND 0.45% SALINE: HCPCS | Performed by: HOSPITALIST

## 2023-08-28 PROCEDURE — 63600175 PHARM REV CODE 636 W HCPCS: Performed by: HOSPITALIST

## 2023-08-28 PROCEDURE — 25000003 PHARM REV CODE 250: Performed by: HOSPITALIST

## 2023-08-28 PROCEDURE — 82962 GLUCOSE BLOOD TEST: CPT

## 2023-08-28 PROCEDURE — 83735 ASSAY OF MAGNESIUM: CPT | Performed by: HOSPITALIST

## 2023-08-28 PROCEDURE — 85025 COMPLETE CBC W/AUTO DIFF WBC: CPT | Performed by: HOSPITALIST

## 2023-08-28 PROCEDURE — 94761 N-INVAS EAR/PLS OXIMETRY MLT: CPT

## 2023-08-28 PROCEDURE — 63600175 PHARM REV CODE 636 W HCPCS: Performed by: NURSE PRACTITIONER

## 2023-08-28 RX ORDER — FAMOTIDINE 20 MG/1
20 TABLET, FILM COATED ORAL 2 TIMES DAILY
Status: DISCONTINUED | OUTPATIENT
Start: 2023-08-28 | End: 2023-08-28 | Stop reason: HOSPADM

## 2023-08-28 RX ADMIN — POTASSIUM CHLORIDE 20 MEQ: 1500 TABLET, EXTENDED RELEASE ORAL at 09:08

## 2023-08-28 RX ADMIN — DEXTROSE MONOHYDRATE 1 G: 5 INJECTION INTRAVENOUS at 02:08

## 2023-08-28 RX ADMIN — FAMOTIDINE 20 MG: 10 INJECTION, SOLUTION INTRAVENOUS at 09:08

## 2023-08-28 RX ADMIN — ENOXAPARIN SODIUM 40 MG: 100 INJECTION SUBCUTANEOUS at 04:08

## 2023-08-28 RX ADMIN — ACETAMINOPHEN 1000 MG: 500 TABLET ORAL at 04:08

## 2023-08-28 RX ADMIN — MUPIROCIN: 20 OINTMENT TOPICAL at 09:08

## 2023-08-28 RX ADMIN — DEXTROSE AND SODIUM CHLORIDE 125 ML/HR: 5; 450 INJECTION, SOLUTION INTRAVENOUS at 02:08

## 2023-08-28 RX ADMIN — POTASSIUM CHLORIDE 20 MEQ: 1500 TABLET, EXTENDED RELEASE ORAL at 02:08

## 2023-08-28 RX ADMIN — ACETAMINOPHEN 1000 MG: 500 TABLET ORAL at 09:08

## 2023-08-28 NOTE — PLAN OF CARE
Problem: Adult Inpatient Plan of Care  Goal: Plan of Care Review  Outcome: Ongoing, Progressing  Flowsheets (Taken 8/28/2023 0901)  Plan of Care Reviewed With:   patient   mother  Goal: Patient-Specific Goal (Individualized)  Outcome: Ongoing, Progressing  Goal: Absence of Hospital-Acquired Illness or Injury  Outcome: Ongoing, Progressing  Intervention: Identify and Manage Fall Risk  Flowsheets (Taken 8/28/2023 0901)  Safety Promotion/Fall Prevention:   bed alarm set   room near unit station   pulse ox   nonskid shoes/socks when out of bed   assistive device/personal item within reach  Intervention: Prevent Skin Injury  Flowsheets (Taken 8/28/2023 0901)  Body Position: position changed independently  Skin Protection:   adhesive use limited   incontinence pads utilized  Intervention: Prevent and Manage VTE (Venous Thromboembolism) Risk  Flowsheets (Taken 8/28/2023 0901)  Activity Management:   Arm raise - L1   Rolling - L1   Sitting at edge of bed - L2  Intervention: Prevent Infection  Flowsheets (Taken 8/28/2023 0901)  Infection Prevention:   hand hygiene promoted   single patient room provided   rest/sleep promoted  Goal: Optimal Comfort and Wellbeing  Outcome: Ongoing, Progressing  Intervention: Monitor Pain and Promote Comfort  Flowsheets (Taken 8/28/2023 0901)  Pain Management Interventions:   care clustered   quiet environment facilitated   pillow support provided  Goal: Readiness for Transition of Care  Outcome: Ongoing, Progressing  Intervention: Mutually Develop Transition Plan  Flowsheets (Taken 8/28/2023 0901)  Do you expect to return to your current living situation?: Yes  Do you have help at home or someone to help you manage your care at home?: Yes     Problem: Diabetes Comorbidity  Goal: Blood Glucose Level Within Targeted Range  Outcome: Ongoing, Progressing  Intervention: Monitor and Manage Glycemia  Flowsheets (Taken 8/28/2023 0901)  Glycemic Management: blood glucose monitored     Problem:  Diabetic Ketoacidosis  Goal: Fluid and Electrolyte Balance with Absence of Ketosis  Outcome: Ongoing, Progressing  Intervention: Monitor and Manage Ketoacidosis  Flowsheets (Taken 8/28/2023 0901)  Fluid/Electrolyte Management: fluids provided  Glycemic Management: blood glucose monitored     Problem: Fluid and Electrolyte Imbalance (Acute Kidney Injury/Impairment)  Goal: Fluid and Electrolyte Balance  Outcome: Ongoing, Progressing  Intervention: Monitor and Manage Fluid and Electrolyte Balance  Flowsheets (Taken 8/28/2023 0901)  Fluid/Electrolyte Management: fluids provided     Problem: Oral Intake Inadequate (Acute Kidney Injury/Impairment)  Goal: Optimal Nutrition Intake  Outcome: Ongoing, Progressing  Intervention: Promote and Optimize Nutrition  Flowsheets (Taken 8/28/2023 0901)  Oral Nutrition Promotion:   rest periods promoted   physical activity promoted   social interaction promoted     Problem: Renal Function Impairment (Acute Kidney Injury/Impairment)  Goal: Effective Renal Function  Outcome: Ongoing, Progressing  Intervention: Monitor and Support Renal Function  Flowsheets (Taken 8/28/2023 0901)  Stabilization Measures: airway opened  Medication Review/Management:   medications reviewed   high-risk medications identified

## 2023-08-28 NOTE — PLAN OF CARE
[facility] ICU  Initial Discharge Assessment       Primary Care Provider: Terrence Breaux NP    Admission Diagnosis: DKA (diabetic ketoacidosis) [E11.10]    Admission Date: 8/27/2023  Expected Discharge Date:     Transition of Care Barriers: None    Payor: BLUE CROSS [facility] / Plan: BCBS OF [place] EMPL[employer]YEES / Product Type: Commercial /     Extended Emergency Contact Information  Primary Emergency Contact: Janeen guajardo  Mobile Phone: [phone]  Relation: Mother  Preferred language: English   needed? No    Discharge Plan A: Home  Discharge Plan B: Home      [facility] [place], [place] - [address]  [address]  [place] [address]  Phone: [phone] Fax: [phone]      Initial Assessment (most recent)       Adult Discharge Assessment - 08/28/23 1116          Discharge Assessment    Assessment Type Discharge Planning Assessment     Source of Information patient;family     Communicated GABRIELLE with patient/caregiver Date not available/Unable to determine     People in Home alone     Do you expect to return to your current living situation? Yes     Do you have help at home or someone to help you manage your care at home? Yes     Who are your caregiver(s) and their phone number(s)? -Janeen [phone]-lives close by     Current cognitive status: Alert/Oriented     Equipment Currently Used at Home none     Patient currently being followed by outpatient case management? No     Do you currently have service(s) that help you manage your care at home? No     Do you take prescription medications? Yes     Do you have prescription coverage? Yes     Do you have any problems affording any of your prescribed medications? No     Is the patient taking medications as prescribed? yes     Who is going to help you get home at discharge? family     How do you get to doctors appointments? car, drives self;family or friend will provide     Are you on dialysis? No     Do  you take coumadin? No     DME Needed Upon Discharge  none     Discharge Plan discussed with: Patient;Parent(s)     Transition of Care Barriers None     Discharge Plan A Home     Discharge Plan B Home        Physical Activity    On average, how many days per week do you engage in moderate to strenuous exercise (like a brisk walk)? 0 days     On average, how many minutes do you engage in exercise at this level? 0 min        Financial Resource Strain    How hard is it for you to pay for the very basics like food, housing, medical care, and heating? Not hard at all        Housing Stability    In the last 12 months, was there a time when you were not able to pay the mortgage or rent on time? No     In the last 12 months, how many places have you lived? 1     In the last 12 months, was there a time when you did not have a steady place to sleep or slept in a shelter (including now)? No        Transportation Needs    In the past 12 months, has lack of transportation kept you from medical appointments or from getting medications? No     In the past 12 months, has lack of transportation kept you from meetings, work, or from getting things needed for daily living? No        Food Insecurity    Within the past 12 months, you worried that your food would run out before you got the money to buy more. Never true     Within the past 12 months, the food you bought just didn't last and you didn't have money to get more. Never true        Stress    Do you feel stress - tense, restless, nervous, or anxious, or unable to sleep at night because your mind is troubled all the time - these days? Not at all        Social Connections    In a typical week, how many times do you talk on the phone with family, friends, or neighbors? More than three times a week     How often do you get together with friends or relatives? More than three times a week     How often do you attend Bahai or Druze services? More than 4 times per year     Do you  belong to any clubs or organizations such as Pentecostalism groups, unions, fraternal or athletic groups, or school groups? No     How often do you attend meetings of the clubs or organizations you belong to? Never     Are you , , , , never , or living with a partner? Never         Alcohol Use    Q1: How often do you have a drink containing alcohol? 2-4 times a month     Q2: How many drinks containing alcohol do you have on a typical day when you are drinking? 1 or 2     Q3: How often do you have six or more drinks on one occasion? Less than monthly                   Spoke with patient in her room. She lives alone. Mother lives close by. She is in the room. Patient is employed. Drives self. Dc plan is home. Cm will follow.

## 2023-08-28 NOTE — DISCHARGE INSTRUCTIONS
Take medications as prescribed by your healthcare provider. Return to nearest Emergency Department if any symptoms of this admission return. Follow up with your primary care provider to ensure continuity of care.

## 2023-08-28 NOTE — CONSULTS
"[facility]  Adult Nutrition  Consult Note         Reason for Assessment  Reason For Assessment: consult   Nutrition Risk Screen: no indicators present    Assessment and Plan  Consult received and appreciated. Consult for Consistent Carbohydrate Diet education. Patient was admitted 8/27 for DKA d/t pump failure.     Visited with patient this morning. Mother was bedside. Patient advised she has been diabetic since she was [age] years old. RD provided educational material on carbohydrate counting. Patient was receptive to information.     Patient is 183 pounds with a BMI of 30.49. She is ordered an 1800kcal Consistent Carbohydrate Diet. Recommend continue same as able/appropriate and tolerated. Encourage good po intakes.     Last BM 8/26 per flowsheet.    Medications/labs reviewed. RD following.     Per MD:  "HPI: 26 yo F , Type 1 DM since age 6, presents to [facility] with N/V and elevated BS.  She states that she had some nausea earlier that morning and her BS were increased and she noticed that her insulin pump was malfunctioning when she arrived at work and hoped she would be able to wait until she got home to give correctional insulin but began having N/V and some diffuse abdominal pain.  She had some dyspnea and called her mother who is an RN and she came and got her and took her to the ED because she felt that she might be in DKA.     Patient has not been in DKA since 2/26/22 when she had gastroenteritis.  Her AG 36 with glu 440 and WBC 27K most likely from stress margination.  She has no signs of infection or diabetic foot changes.  I will order stat trop and 12 lead EKG and pregnancy test."          Skin Integrity  Kervin Risk Assessment  Sensory Perception: 4-->no impairment  Moisture: 4-->rarely moist  Activity: 4-->walks frequently  Mobility: 4-->no limitation  Nutrition: 3-->adequate  Friction and Shear: 3-->no apparent problem  Kervin Score: " 22        Malnutrition  Is patient malnourished? No      Nutrition Diagnosis    Altered Nutrition Related Lab Values related to DKA as evidenced by elevated glucose, elevated anion gap on admission      Interventions/Recommendations (treatment strategy):  Recommend continue current diet as able/appropriate and tolerated. Encourage good po intakes.    Nutrition Diagnosis Status:   New     Nutrition Risk  Level of Risk/Frequency of Follow-up: low    Recent Labs   Lab 08/28/23  0443 08/28/23  0505 08/28/23  0912   *  --   --    POCGLU  --    < > 169*    < > = values in this interval not displayed.     Comments on Glucose: Glucose elevated. PMH DM1.    Nutrition Prescription / Recommendations  Recommendation/Intervention: Recommend continue current diet as able/appropriate and tolerated. Encourage good po intakes.  Goals: Weight maintenance, intake % of meals  Nutrition Goal Status: new  Current Diet Order: 1800kcal Consistent Carbohydrate Diet  Chewing or Swallowing Difficulty?: No Chewing or swallowing difficulty  Recommended Diet: Consistent Carbohydrate 1800 (60g Carbs)  Recommended Oral Supplement: No Oral Supplements  Is Nutrition Support Recommended: No   Is Education Recommended: Yes - Type: Consistent Carbohydrate - Education Given: yes  Monitor and Evaluation  % current Intake: New Diet order with no P.O. intake documented currently  % intake to meet estimated needs: 75 - 100 %  Food and Nutrient Adminstration: diet order  Anthropometric Measurements: weight, weight change  Biochemical Data, Medical Tests and Procedures: electrolyte and renal panel, gastrointestinal profile, glucose/endocrine profile, inflammatory profile, lipid profile     Current Medical Diagnosis and Past Medical History  Diagnosis: diabetes diagnosis/complications  Past Medical History:   Diagnosis Date    Type 1 diabetes     diagnosed at age 6     Nutrition/Diet History     Lab/Procedures/Meds  Recent Labs   Lab  "08/26/23 2112 08/27/23 0224 08/28/23  0443      < > 140   K 4.6   < > 3.4*   BUN 13   < > 7   CREATININE 1.60*   < > 0.84   CALCIUM 10.6*   < > 8.8   ALBUMIN 4.9  --   --    CL 95*   < > 112*   ALT 34  --   --    AST 18  --   --    PHOS 6.1*   < > 2.0*    < > = values in this interval not displayed.   Note: Cr, Ca++ elevated, Ph, Cl- low. Will monitor.     Last A1c:   Lab Results   Component Value Date    HGBA1C 8.9 (H) 08/26/2023     Lab Results   Component Value Date    RBC 4.82 08/28/2023    HGB 13.3 08/28/2023    HCT 39.3 08/28/2023    MCV 81.5 08/28/2023    MCH 27.6 08/28/2023    MCHC 33.8 08/28/2023     Pertinent Labs Reviewed: reviewed  Pertinent Labs Comments: Sodium: 140  Potassium: 3.4 (L)  Chloride: 112 (H)  CO2: 20 (L)  Anion Gap: 11  BUN: 7  Creatinine: 0.84  BUN/CREAT RATIO: 8  eGFR: 99  Glucose: 137 (H)  Calcium: 8.8  Phosphorus: 2.0 (L)  Magnesium: 2.0  Pertinent Medications Reviewed: reviewed  Pertinent Medications Comments: rocephin, enoxaparin  Anthropometrics  Temp: 98.2 °F (36.8 °C)  Height Method: Stated  Height: 5' 5" (165.1 cm)  Height (inches): 65 in  Weight Method: Bed Scale  Weight: 83.1 kg (183 lb 3.2 oz)  Weight (lb): 183.2 lb  Ideal Body Weight (IBW), Female: 125 lb  % Ideal Body Weight, Female (lb): 148.48 %  BMI (Calculated): 30.5     Estimated/Assessed Needs  RMR (Jensen-St. Jeor Equation): 1576.88     Temp: 98.2 °F (36.8 °C)Oral  Weight Used For Calorie Calculations: 83 kg (183 lb)     Energy Calorie Requirements (kcal): 2075-2490kcal/kg  Weight Used For Protein Calculations: 83 kg (183 lb)  Protein Requirements: 67-83g/kg       RDA Method (mL): 2075     Nutrition by Nursing  Diet/Nutrition Received: consistent carb/diabetic diet           Last Bowel Movement: 08/26/23                Nutrition Follow-Up  RD Follow-up?: Yes      Zahida Baer, MS, RD, LD  Available through Secure Chat   "

## 2023-08-28 NOTE — DISCHARGE SUMMARY
[facility]  Pulmonology  Discharge Summary      Patient Name: Mariposa Rivera  MRN: [id number]  Admission Date: 8/27/2023  Hospital Length of Stay: 1 days  Discharge Date and Time:  08/28/2023 4:56 PM  Attending Physician: Donnie Conklin MD   Discharging Provider: Soledad Preston AG-ACNP  Primary Care Provider: Terrence Breaux NP    HPI:   26 yo F , Type 1 DM since age 6, presents to [facility] with N/V and elevated BS.  She states that she had some nausea earlier that morning and her BS were increased and she noticed that her insulin pump was malfunctioning when she arrived at work and hoped she would be able to wait until she got home to give correctional insulin but began having N/V and some diffuse abdominal pain.  She had some dyspnea and called her mother who is an RN and she came and got her and took her to the ED because she felt that she might be in DKA.        * No surgery found *    Indwelling Lines/Drains at Time of Discharge:   Lines/Drains/Airways     None                 Hospital Course:  Patient patient admitted for DKA secondary to insulin pump malfunction.  She did have leukocytosis on admit which was felt to be secondary to diabetic ketoacidosis.  Cultures are negative.  Patient was transitioned to her home insulin pump this morning and has done well.  She is tolerating a diet.  She is met maximal benefit of this hospitalization will be discharged home to follow up with her primary care physician.      Goals of Care Treatment Preferences:  Code Status: Full Code      Consults (From admission, onward)        Status Ordering Provider     Inpatient consult to Registered Dietitian/Nutritionist  Once        Provider:  (Not yet assigned)    Completed GIOVANNY BURGESS          Significant Labs:  All pertinent labs within the past 24 hours have been reviewed.    Significant Imaging:  I have reviewed all pertinent imaging results/findings within the  past 24 hours.    Pending Diagnostic Studies:     Procedure Component Value Units Date/Time    EXTRA TUBES [[id number]] Collected: 08/27/23 0224    Order Status: Sent Lab Status: In process Updated: 08/27/23 0241    Specimen: Blood, Venous     Narrative:      The following orders were created for panel order EXTRA TUBES.  Procedure                               Abnormality         Status                     ---------                               -----------         ------                     Lavender Top Hold[[id number]]                                In process                   Please view results for these tests on the individual orders.        Final Active Diagnoses:    Diagnosis Date Noted POA    DM (diabetes mellitus), type 1 [E10.9] 08/28/2023 Unknown      Problems Resolved During this Admission:    Diagnosis Date Noted Date Resolved POA    PRINCIPAL PROBLEM:  Type 1 diabetes mellitus with ketoacidosis without coma [E10.10] 08/27/2023 08/28/2023 Yes    JERRY (acute kidney injury) [N17.9] 08/27/2023 08/28/2023 Yes    Leukocytosis [D72.829] 08/27/2023 08/28/2023 Unknown    Tachycardia [R00.0] 08/27/2023 08/28/2023 Unknown    Diabetic ketoacidosis without coma associated with type 1 diabetes mellitus [E10.10] 08/27/2023 08/28/2023 Unknown       Discharged Condition: stable    Disposition: Home or Self Care    Follow Up:   Follow-up Information     Terrence Breaux NP Follow up in 3 day(s).    Specialty: Family Medicine  Contact information:  [address] [place] [id number] [phone]                       Patient Instructions:   No discharge procedures on file.  Medications:  Reconciled Home Medications:      Medication List      CONTINUE taking these medications    insulin lispro 100 unit/mL injection  Inject into the skin 3 (three) times daily before meals.     lisdexamfetamine 40 MG Cap  Commonly known as: VYVANSE  Take 40 mg by mouth once daily.            Soledad Preston, [facility]  Pulmonology  [facility]

## 2023-08-28 NOTE — SUBJECTIVE & OBJECTIVE
Interval History:  Patient without complaints      Objective:     Vital Signs (Most Recent):  Temp: 97.6 °F (36.4 °C) (08/28/23 0301)  Pulse: 75 (08/28/23 0532)  Resp: 19 (08/28/23 0532)  BP: 113/68 (08/28/23 0532)  SpO2: 98 % (08/28/23 0532) Vital Signs (24h Range):  Temp:  [97.6 °F (36.4 °C)-98.5 °F (36.9 °C)] 97.6 °F (36.4 °C)  Pulse:  [] 75  Resp:  [14-30] 19  SpO2:  [93 %-100 %] 98 %  BP: ()/(50-85) 113/68     Weight: 83.1 kg (183 lb 3.2 oz)  Body mass index is 30.49 kg/m².      Intake/Output Summary (Last 24 hours) at 8/28/2023 0603  Last data filed at 8/28/2023 0501  Gross per 24 hour   Intake 3145.96 ml   Output --   Net 3145.96 ml        Physical Exam  Vitals reviewed.   Constitutional:       Appearance: Normal appearance.      Interventions: She is not intubated.  HENT:      Head: Normocephalic and atraumatic.      Nose: Nose normal.      Mouth/Throat:      Mouth: Mucous membranes are dry.      Pharynx: Oropharynx is clear.   Eyes:      Extraocular Movements: Extraocular movements intact.      Conjunctiva/sclera: Conjunctivae normal.      Pupils: Pupils are equal, round, and reactive to light.   Cardiovascular:      Rate and Rhythm: Normal rate.      Heart sounds: Normal heart sounds. No murmur heard.  Pulmonary:      Effort: Pulmonary effort is normal. She is not intubated.      Breath sounds: Normal breath sounds.   Abdominal:      General: Abdomen is flat. Bowel sounds are normal.      Palpations: Abdomen is soft.   Musculoskeletal:         General: Normal range of motion.      Cervical back: Normal range of motion and neck supple.      Right lower leg: No edema.      Left lower leg: No edema.   Skin:     General: Skin is warm and dry.      Capillary Refill: Capillary refill takes less than 2 seconds.   Neurological:      General: No focal deficit present.      Mental Status: She is alert and oriented to person, place, and time.   Psychiatric:         Mood and Affect: Mood normal.          Behavior: Behavior normal.           Review of Systems    Vents:  Oxygen Concentration (%): 21 (08/27/23 0730)    Lines/Drains/Airways       Peripheral Intravenous Line  Duration                  Peripheral IV - Single Lumen 08/27/23 0234 20 G Anterior;Left Forearm 1 day         Peripheral IV - Single Lumen 08/27/23 20 G Right Antecubital 1 day                    Significant Labs:    CBC/Anemia Profile:  Recent Labs   Lab 08/26/23 2112   WBC 27.30*   HGB 16.3*   HCT 48.5*   *   MCV 82.3   RDW 13.4        Chemistries:  Recent Labs   Lab 08/26/23 2112 08/27/23  0224 08/27/23  1119 08/27/23  1752 08/27/23  2353    140 138 138 141   K 4.6 4.4 3.6 2.9* 3.3*   CL 95* 107 111* 110* 115*   CO2 10* 10* 17* 21 19*   BUN 13 11 9 8 7   CREATININE 1.60* 1.27* 1.15* 1.10* 0.90   CALCIUM 10.6* 9.3 9.1 8.7 8.5   ALBUMIN 4.9  --   --   --   --    PROT 8.6*  --   --   --   --    BILITOT 0.8  --   --   --   --    ALKPHOS 137*  --   --   --   --    ALT 34  --   --   --   --    AST 18  --   --   --   --    MG 1.8 2.0  --   --   --    PHOS 6.1* 3.9  --   --   --        Recent Lab Results  (Last 5 results in the past 24 hours)        08/28/23  0505   08/28/23  0356   08/28/23  0258   08/28/23  0157   08/28/23  0055        POC Glucose 136   139   159   140   119                              Significant Imaging:  I have reviewed all pertinent imaging results/findings within the past 24 hours.

## 2023-08-28 NOTE — PROGRESS NOTES
[facility]  Pulmonology  Progress Note    Patient Name: Mariposa Rivera  MRN: [id number]  Admission Date: 8/27/2023  Hospital Length of Stay: 1 days  Code Status: Full Code  Attending Provider: Donnie Conklin MD  Primary Care Provider: Terrence Breaux NP   Principal Problem: Type 1 diabetes mellitus with ketoacidosis without coma    Subjective:     Interval History:  Patient without complaints      Objective:     Vital Signs (Most Recent):  Temp: 97.6 °F (36.4 °C) (08/28/23 0301)  Pulse: 75 (08/28/23 0532)  Resp: 19 (08/28/23 0532)  BP: 113/68 (08/28/23 0532)  SpO2: 98 % (08/28/23 0532) Vital Signs (24h Range):  Temp:  [97.6 °F (36.4 °C)-98.5 °F (36.9 °C)] 97.6 °F (36.4 °C)  Pulse:  [] 75  Resp:  [14-30] 19  SpO2:  [93 %-100 %] 98 %  BP: ()/(50-85) 113/68     Weight: 83.1 kg (183 lb 3.2 oz)  Body mass index is 30.49 kg/m².      Intake/Output Summary (Last 24 hours) at 8/28/2023 0603  Last data filed at 8/28/2023 0501  Gross per 24 hour   Intake 3145.96 ml   Output --   Net 3145.96 ml        Physical Exam  Vitals reviewed.   Constitutional:       Appearance: Normal appearance.      Interventions: She is not intubated.  HENT:      Head: Normocephalic and atraumatic.      Nose: Nose normal.      Mouth/Throat:      Mouth: Mucous membranes are dry.      Pharynx: Oropharynx is clear.   Eyes:      Extraocular Movements: Extraocular movements intact.      Conjunctiva/sclera: Conjunctivae normal.      Pupils: Pupils are equal, round, and reactive to light.   Cardiovascular:      Rate and Rhythm: Normal rate.      Heart sounds: Normal heart sounds. No murmur heard.  Pulmonary:      Effort: Pulmonary effort is normal. She is not intubated.      Breath sounds: Normal breath sounds.   Abdominal:      General: Abdomen is flat. Bowel sounds are normal.      Palpations: Abdomen is soft.   Musculoskeletal:         General: Normal range of motion.      Cervical back: Normal range of motion and  neck supple.      Right lower leg: No edema.      Left lower leg: No edema.   Skin:     General: Skin is warm and dry.      Capillary Refill: Capillary refill takes less than 2 seconds.   Neurological:      General: No focal deficit present.      Mental Status: She is alert and oriented to person, place, and time.   Psychiatric:         Mood and Affect: Mood normal.         Behavior: Behavior normal.           Review of Systems    Vents:  Oxygen Concentration (%): 21 (08/27/23 0730)    Lines/Drains/Airways       Peripheral Intravenous Line  Duration                  Peripheral IV - Single Lumen 08/27/23 0234 20 G Anterior;Left Forearm 1 day         Peripheral IV - Single Lumen 08/27/23 20 G Right Antecubital 1 day                    Significant Labs:    CBC/Anemia Profile:  Recent Labs   Lab 08/26/23 2112   WBC 27.30*   HGB 16.3*   HCT 48.5*   *   MCV 82.3   RDW 13.4        Chemistries:  Recent Labs   Lab 08/26/23 2112 08/27/23  0224 08/27/23  1119 08/27/23  1752 08/27/23  2353    140 138 138 141   K 4.6 4.4 3.6 2.9* 3.3*   CL 95* 107 111* 110* 115*   CO2 10* 10* 17* 21 19*   BUN 13 11 9 8 7   CREATININE 1.60* 1.27* 1.15* 1.10* 0.90   CALCIUM 10.6* 9.3 9.1 8.7 8.5   ALBUMIN 4.9  --   --   --   --    PROT 8.6*  --   --   --   --    BILITOT 0.8  --   --   --   --    ALKPHOS 137*  --   --   --   --    ALT 34  --   --   --   --    AST 18  --   --   --   --    MG 1.8 2.0  --   --   --    PHOS 6.1* 3.9  --   --   --        Recent Lab Results  (Last 5 results in the past 24 hours)        08/28/23  0505   08/28/23  0356   08/28/23  0258   08/28/23  0157   08/28/23  0055        POC Glucose 136   139   159   140   119                              Significant Imaging:  I have reviewed all pertinent imaging results/findings within the past 24 hours.    Assessment/Plan:     Cardiac/Vascular  Tachycardia  Resolved    Renal/  JERRY (acute kidney injury)  Resolved      Oncology  Leukocytosis  No cultures obtained at  this admission currently on empiric antibiotics will see what CBC looks like when it comes back    Endocrine  * Type 1 diabetes mellitus with ketoacidosis without coma  Secondary to pump failure  Continue DKA protocol, IVFs, labs  Treat nausea/vomting     Family to bring new pump and supplies for when her gap closes and she is able to eat a diet.                  Donnie Conklin MD  Pulmonology  [facility]

## 2023-08-28 NOTE — ASSESSMENT & PLAN NOTE
No cultures obtained at this admission currently on empiric antibiotics will see what CBC looks like when it comes back

## 2023-08-28 NOTE — PLAN OF CARE
Problem: Adult Inpatient Plan of Care  Goal: Absence of Hospital-Acquired Illness or Injury  Outcome: Ongoing, Progressing  Intervention: Prevent Infection  Flowsheets (Taken 8/27/2023 1920)  Infection Prevention: hand hygiene promoted     Problem: Fluid and Electrolyte Imbalance (Acute Kidney Injury/Impairment)  Goal: Fluid and Electrolyte Balance  Outcome: Ongoing, Progressing

## 2023-08-28 NOTE — HOSPITAL COURSE
Patient patient admitted for DKA secondary to insulin pump malfunction.  She did have leukocytosis on admit which was felt to be secondary to diabetic ketoacidosis.  Cultures are negative.  Patient was transitioned to her home insulin pump this morning and has done well.  She is tolerating a diet.  She is met maximal benefit of this hospitalization will be discharged home to follow up with her primary care physician.

## 2023-08-29 NOTE — PHYSICIAN QUERY
PT Name: Mariposa Rivera  MR #: [id number]     DOCUMENTATION CLARIFICATION     CDS/: SY WILL MSN RN             Contact information:[email or web address]  This form is a permanent document in the medical record.    Query Date: August 28, 2023    By submitting this query, we are merely seeking further clarification of documentation to reflect the severity of illness of your patient. Please utilize your independent clinical judgment when addressing the question(s) below.    The Medical Record reflects the following:     Indicators   Supporting Clinical Findings Location in Medical Record   x Lab Value(s)  08/27/23 11:19 08/27/23 17:52 08/27/23 23:53 08/28/23 04:43   Potassium 3.6 2.9 (L) 3.3 (L) 3.4 (L)      Lab results   x Treatment/Medication KCL 10 mEq  ml- 08/27    Potassium Chloride 40 mEq po daily- started 08/27    Potassium Chloride 20 mEq po TID- started 08/28   MAR    MAR    MAR    Other       Provider, please specify the diagnosis or diagnoses that correspond(s) to the above indicators. Lan all that apply:    [ x  ] Hypokalemia   [   ] Other electrolyte disturbance (please specify): __________   [   ]  Clinically Undetermined       Please document in your progress notes daily for the duration of treatment until resolved, and include in your discharge summary.

## 2023-08-29 NOTE — PLAN OF CARE
[facility] - Emergency Department  Discharge Final Note    Primary Care Provider: Terrence Breaux NP    Expected Discharge Date:     Final Discharge Note (most recent)       Final Note - 08/29/23 0811          Final Note    Assessment Type Final Discharge Note     Anticipated Discharge Disposition Home or Self Care                     Important Message from Medicare             Patient discharged home.

## 2025-02-05 ENCOUNTER — HOSPITAL ENCOUNTER (EMERGENCY)
Facility: HOSPITAL | Age: 30
Discharge: HOME | End: 2025-02-05
Payer: COMMERCIAL

## 2025-02-05 VITALS
TEMPERATURE: 99 F | HEIGHT: 65 IN | RESPIRATION RATE: 18 BRPM | SYSTOLIC BLOOD PRESSURE: 119 MMHG | WEIGHT: 196.38 LBS | DIASTOLIC BLOOD PRESSURE: 85 MMHG | HEART RATE: 80 BPM | OXYGEN SATURATION: 100 % | BODY MASS INDEX: 32.72 KG/M2

## 2025-02-05 DIAGNOSIS — N12 PYELITIS: Primary | ICD-10-CM

## 2025-02-05 LAB
ANION GAP SERPL CALCULATED.3IONS-SCNC: 18 MMOL/L (ref 7–16)
BACTERIA #/AREA URNS HPF: ABNORMAL /HPF
BASOPHILS # BLD AUTO: 0.01 K/UL (ref 0–0.2)
BASOPHILS NFR BLD AUTO: 0.1 % (ref 0–1)
BILIRUB UR QL STRIP: NEGATIVE
BUN SERPL-MCNC: 6 MG/DL (ref 7–19)
BUN/CREAT SERPL: 8 (ref 6–20)
CALCIUM SERPL-MCNC: 9.5 MG/DL (ref 8.4–10.2)
CHLORIDE SERPL-SCNC: 106 MMOL/L (ref 98–107)
CLARITY UR: ABNORMAL
CO2 SERPL-SCNC: 19 MMOL/L (ref 22–29)
COLOR UR: YELLOW
CREAT SERPL-MCNC: 0.75 MG/DL (ref 0.55–1.02)
DIFFERENTIAL METHOD BLD: ABNORMAL
EGFR (NO RACE VARIABLE) (RUSH/TITUS): 112 ML/MIN/1.73M2
EOSINOPHIL # BLD AUTO: 0.02 K/UL (ref 0–0.5)
EOSINOPHIL NFR BLD AUTO: 0.1 % (ref 1–4)
ERYTHROCYTE [DISTWIDTH] IN BLOOD BY AUTOMATED COUNT: 13.4 % (ref 11.5–14.5)
GLUCOSE SERPL-MCNC: 185 MG/DL (ref 74–100)
GLUCOSE UR STRIP-MCNC: 500 MG/DL
HCG UR QL IA.RAPID: NEGATIVE
HCT VFR BLD AUTO: 40 % (ref 38–47)
HGB BLD-MCNC: 13 G/DL (ref 12–16)
KETONES UR STRIP-SCNC: 80 MG/DL
LACTATE SERPL-SCNC: 0.9 MMOL/L (ref 0.5–2.2)
LEUKOCYTE ESTERASE UR QL STRIP: ABNORMAL
LYMPHOCYTES # BLD AUTO: 1.04 K/UL (ref 1–4.8)
LYMPHOCYTES NFR BLD AUTO: 6.8 % (ref 27–41)
MCH RBC QN AUTO: 26.9 PG (ref 27–31)
MCHC RBC AUTO-ENTMCNC: 32.5 G/DL (ref 32–36)
MCV RBC AUTO: 82.8 FL (ref 80–96)
MONOCYTES # BLD AUTO: 0.77 K/UL (ref 0–0.8)
MONOCYTES NFR BLD AUTO: 5.1 % (ref 2–6)
MPC BLD CALC-MCNC: 9.7 FL (ref 9.4–12.4)
NEUTROPHILS # BLD AUTO: 13.36 K/UL (ref 1.8–7.7)
NEUTROPHILS NFR BLD AUTO: 87.9 % (ref 53–65)
NITRITE UR QL STRIP: POSITIVE
PH UR STRIP: 6 PH UNITS
PLATELET # BLD AUTO: 317 K/UL (ref 150–400)
POTASSIUM SERPL-SCNC: 3.7 MMOL/L (ref 3.5–5.1)
PROT UR QL STRIP: >=300
RBC # BLD AUTO: 4.83 M/UL (ref 4.2–5.4)
RBC # UR STRIP: ABNORMAL /UL
RBC #/AREA URNS HPF: ABNORMAL /HPF
SODIUM SERPL-SCNC: 139 MMOL/L (ref 136–145)
SP GR UR STRIP: 1.02
SQUAMOUS #/AREA URNS LPF: ABNORMAL /LPF
UROBILINOGEN UR STRIP-ACNC: 0.2 MG/DL
WBC # BLD AUTO: 15.2 K/UL (ref 4.5–11)
WBC #/AREA URNS HPF: ABNORMAL /HPF

## 2025-02-05 PROCEDURE — 96361 HYDRATE IV INFUSION ADD-ON: CPT

## 2025-02-05 PROCEDURE — 80048 BASIC METABOLIC PNL TOTAL CA: CPT | Performed by: NURSE PRACTITIONER

## 2025-02-05 PROCEDURE — 83605 ASSAY OF LACTIC ACID: CPT | Performed by: NURSE PRACTITIONER

## 2025-02-05 PROCEDURE — 99284 EMERGENCY DEPT VISIT MOD MDM: CPT | Mod: ,,, | Performed by: NURSE PRACTITIONER

## 2025-02-05 PROCEDURE — 25500020 PHARM REV CODE 255: Performed by: NURSE PRACTITIONER

## 2025-02-05 PROCEDURE — 87077 CULTURE AEROBIC IDENTIFY: CPT | Performed by: NURSE PRACTITIONER

## 2025-02-05 PROCEDURE — 85025 COMPLETE CBC W/AUTO DIFF WBC: CPT | Performed by: NURSE PRACTITIONER

## 2025-02-05 PROCEDURE — 25000003 PHARM REV CODE 250: Performed by: NURSE PRACTITIONER

## 2025-02-05 PROCEDURE — 63600175 PHARM REV CODE 636 W HCPCS: Performed by: NURSE PRACTITIONER

## 2025-02-05 PROCEDURE — 81025 URINE PREGNANCY TEST: CPT | Performed by: NURSE PRACTITIONER

## 2025-02-05 PROCEDURE — 96375 TX/PRO/DX INJ NEW DRUG ADDON: CPT

## 2025-02-05 PROCEDURE — 99285 EMERGENCY DEPT VISIT HI MDM: CPT | Mod: 25

## 2025-02-05 PROCEDURE — 81003 URINALYSIS AUTO W/O SCOPE: CPT | Performed by: NURSE PRACTITIONER

## 2025-02-05 PROCEDURE — 96374 THER/PROPH/DIAG INJ IV PUSH: CPT

## 2025-02-05 RX ORDER — SULFAMETHOXAZOLE AND TRIMETHOPRIM 800; 160 MG/1; MG/1
1 TABLET ORAL 2 TIMES DAILY
Qty: 14 TABLET | Refills: 0 | Status: SHIPPED | OUTPATIENT
Start: 2025-02-05 | End: 2025-02-12

## 2025-02-05 RX ORDER — DEXTROAMPHETAMINE SACCHARATE, AMPHETAMINE ASPARTATE MONOHYDRATE, DEXTROAMPHETAMINE SULFATE AND AMPHETAMINE SULFATE 5; 5; 5; 5 MG/1; MG/1; MG/1; MG/1
CAPSULE, EXTENDED RELEASE ORAL
COMMUNITY
Start: 2025-01-28

## 2025-02-05 RX ORDER — IOPAMIDOL 755 MG/ML
100 INJECTION, SOLUTION INTRAVASCULAR
Status: COMPLETED | OUTPATIENT
Start: 2025-02-05 | End: 2025-02-05

## 2025-02-05 RX ORDER — CEFTRIAXONE 1 G/1
1 INJECTION, POWDER, FOR SOLUTION INTRAMUSCULAR; INTRAVENOUS
Status: COMPLETED | OUTPATIENT
Start: 2025-02-05 | End: 2025-02-05

## 2025-02-05 RX ORDER — ONDANSETRON HYDROCHLORIDE 2 MG/ML
4 INJECTION, SOLUTION INTRAVENOUS
Status: COMPLETED | OUTPATIENT
Start: 2025-02-05 | End: 2025-02-05

## 2025-02-05 RX ORDER — ACETAMINOPHEN 500 MG
1000 TABLET ORAL
Status: COMPLETED | OUTPATIENT
Start: 2025-02-05 | End: 2025-02-05

## 2025-02-05 RX ORDER — ONDANSETRON 4 MG/1
8 TABLET, ORALLY DISINTEGRATING ORAL 2 TIMES DAILY
Qty: 20 TABLET | Refills: 0 | Status: SHIPPED | OUTPATIENT
Start: 2025-02-05

## 2025-02-05 RX ADMIN — SODIUM CHLORIDE 1000 ML: 9 INJECTION, SOLUTION INTRAVENOUS at 08:02

## 2025-02-05 RX ADMIN — IOPAMIDOL 100 ML: 755 INJECTION, SOLUTION INTRAVENOUS at 09:02

## 2025-02-05 RX ADMIN — ONDANSETRON 4 MG: 2 INJECTION INTRAMUSCULAR; INTRAVENOUS at 08:02

## 2025-02-05 RX ADMIN — ACETAMINOPHEN 1000 MG: 500 TABLET ORAL at 08:02

## 2025-02-05 RX ADMIN — CEFTRIAXONE SODIUM 1 G: 1 INJECTION, POWDER, FOR SOLUTION INTRAMUSCULAR; INTRAVENOUS at 08:02

## 2025-02-05 NOTE — ED PROVIDER NOTES
Encounter Date: 2/5/2025       History     Chief Complaint   Patient presents with    Abdominal Pain     Lower     Nausea    Vomiting     Patient presents today with complaint of lower pelvic and back pain with nausea and vomiting.  Her symptoms began 4 days ago with lower pelvic pain.  Progress to bilateral flank and back pain.  Woke up this morning with more severe pain nausea and vomiting.  Patient has a history frequent urinary tract infections with pyelonephritis.  She is a type 1 diabetic.  She denies fever however has had some aches and chills.        Review of patient's allergies indicates:  No Known Allergies  Past Medical History:   Diagnosis Date    Type 1 diabetes     diagnosed at age 6     Past Surgical History:   Procedure Laterality Date    OOPHORECTOMY Right      Family History   Problem Relation Name Age of Onset    No Known Problems Mother       Social History     Tobacco Use    Smoking status: Never    Smokeless tobacco: Never   Substance Use Topics    Alcohol use: Never    Drug use: Never     Review of Systems   Constitutional:  Positive for chills.   Respiratory: Negative.     Cardiovascular: Negative.    All other systems reviewed and are negative.      Physical Exam     Initial Vitals [02/05/25 0736]   BP Pulse Resp Temp SpO2   (!) 144/91 95 20 97.8 °F (36.6 °C) 100 %      MAP       --         Physical Exam    Nursing note and vitals reviewed.  Constitutional: She appears well-developed and well-nourished.   Cardiovascular:  Normal rate, regular rhythm and normal heart sounds.           No murmur heard.  Pulmonary/Chest: Breath sounds normal. No respiratory distress.   Abdominal: Abdomen is soft. Bowel sounds are normal. She exhibits no distension. Tenderness: suprapubic.   Musculoskeletal:         General: No edema. Normal range of motion.      Comments: Bilateral CVA tenderness     Neurological: She is alert and oriented to person, place, and time. She has normal strength. No cranial nerve  deficit.   Skin: Skin is warm and dry.   Psychiatric: She has a normal mood and affect.         Medical Screening Exam   See Full Note    ED Course   Procedures  Labs Reviewed   URINALYSIS, REFLEX TO URINE CULTURE - Abnormal       Result Value    Color, UA Yellow      Clarity, UA Cloudy      pH, UA 6.0      Leukocytes, UA Small (*)     Nitrites, UA Positive (*)     Protein, UA >=300 (*)     Glucose,  (*)     Ketones, UA 80 (*)     Urobilinogen, UA 0.2      Bilirubin, UA Negative      Blood, UA Large (*)     Specific [place], UA 1.025     URINALYSIS, MICROSCOPIC - Abnormal    WBC, UA Too Numerous To Count (*)     RBC, UA 10-15 (*)     Bacteria, UA Many (*)     Squamous Epithelial Cells, UA Rare     BASIC METABOLIC PANEL - Abnormal    Sodium 139      Potassium 3.7      Chloride 106      CO2 19 (*)     Anion Gap 18 (*)     Glucose 185 (*)     BUN 6 (*)     Creatinine 0.75      BUN/Creatinine Ratio 8      Calcium 9.5      eGFR 112     CBC WITH DIFFERENTIAL - Abnormal    WBC 15.20 (*)     RBC 4.83      Hemoglobin 13.0      Hematocrit 40.0      MCV 82.8      MCH 26.9 (*)     MCHC 32.5      RDW 13.4      Platelet Count 317      MPV 9.7      Neutrophils % 87.9 (*)     Lymphocytes % 6.8 (*)     Neutrophils, Abs 13.36 (*)     Lymphocytes, Absolute 1.04      Diff Type Auto      Monocytes % 5.1      Eosinophils % 0.1 (*)     Basophils % 0.1      Monocytes, Absolute 0.77      Eosinophils, Absolute 0.02      Basophils, Absolute 0.01     HCG QUALITATIVE URINE - Normal    HCG Qualitative, Urine Negative     LACTIC ACID, PLASMA - Normal    Lactic Acid 0.9     CULTURE, URINE   CBC W/ AUTO DIFFERENTIAL    Narrative:     The following orders were created for panel order CBC Auto Differential.  Procedure                               Abnormality         Status                     ---------                               -----------         ------                     CBC with Differential[[id number]]       Abnormal            Final  result                 Please view results for these tests on the individual orders.          Imaging Results              CT Abdomen Pelvis With IV Contrast NO Oral Contrast (Final result)  Result time 02/05/25 09:30:37      Final result by Xander Sandoval MD (02/05/25 09:30:37)                   Impression:      1. On the right, there is a mild degree of hydroureter and prominence of the central renal collecting system with associated urothelial thickening enhancement. Inflammatory changes are noted in the adjoining retroperitoneal fat planes.  Findings concerning for urinary tract infection and pyelitis, with possible developing/early pyelonephritis.  The possibility of sequela of recently passed stone contributing to the above would not be excluded in appropriate clinical context.  2. Additionally noted circumferential urinary bladder wall thickening with inflammatory changes in the surrounding fat planes concerning for cystitis.  3. Additional details, as provided in the body of the report.      Electronically signed by: Xander Sandoval  Date:    02/05/2025  Time:    09:30               Narrative:    EXAMINATION:  CT ABDOMEN PELVIS WITH IV CONTRAST    CLINICAL HISTORY:  LLQ abdominal pain;    TECHNIQUE:  Low dose axial images, sagittal and coronal reformations were obtained from the lung bases to the pubic symphysis following the IV administration of 100 mL of Isovue-370.    COMPARISON:  CT of the abdomen and pelvis performed 05/29/2017.    FINDINGS:  Lower chest: Unremarkable.    Liver: Unremarkable.    Gallbladder and bile ducts: Unremarkable. No biliary ductal dilatation.    Pancreas: Unremarkable.    Spleen: Unremarkable.    Adrenals: Unremarkable.    Kidneys:    On the right, there is a mild degree of hydroureter and prominence of the central renal collecting system with associated urothelial thickening enhancement.  Inflammatory changes are noted in the adjoining retroperitoneal fat planes.    No  definite obstructing radiopaque urolithiasis is noted, noting slight limitation imposed by presence of intravenous contrast.    No evidence of left-sided hydroureteronephrosis.  Subcentimeter hypodense lesions in both kidneys too small to characterize.    Lymph nodes: Mildly prominent number, but small mesenteric and retroperitoneal lymph nodes are nonspecific and may be reactive in etiology.    Bowel and mesentery: No evidence of bowel obstruction.  Moderate volume colonic stool.  Mild colonic diverticulosis.Appendix not confidently identified.  No definite focal pericecal inflammation.    Abdominal aorta: Unremarkable.    Inferior vena cava: Unremarkable.    Free fluid or free air: Small volume simple appearing free fluid dependently in the pelvis, possibly reactive and/or physiologic.    Pelvis: Circumferential wall thickening of the urinary bladder with adjoining inflammatory changes.    Body wall: Unremarkable.    Bones: No acute change.                                       Medications   sodium chloride 0.9% bolus 1,000 mL 1,000 mL (0 mLs Intravenous Stopped 2/5/25 0927)   cefTRIAXone injection 1 g (1 g Intravenous Given 2/5/25 0832)   ondansetron injection 4 mg (4 mg Intravenous Given 2/5/25 0831)   acetaminophen tablet 1,000 mg (1,000 mg Oral Given 2/5/25 0831)   iopamidoL (ISOVUE-370) injection 100 mL (100 mLs Intravenous Given 2/5/25 0907)     Medical Decision Making  Amount and/or Complexity of Data Reviewed  Labs: ordered. Decision-making details documented in ED Course.  Radiology: ordered.    Risk  OTC drugs.  Prescription drug management.               ED Course as of 02/05/25 0952   Wed Feb 05, 2025   0816 WBC, UA(!): Too Numerous To Count [BC]   0816 RBC, UA(!): 10-15 [BC]   0816 Bacteria, UA(!): Many  Patient's symptoms and urinalysis are consistent with acute urinary tract infection.  Patient has significant past medical history for urinary tract infections with pyelonephritis and is a type 1  diabetic.  We will get a CBC, chemistry, lactic acid and a CT abdomen and pelvis with IV contrast this patient does have suprapubic tenderness as well as bilateral CVA tenderness.  She has also had some nausea and vomiting this morning. [BC]   0904 WBC(!): 15.20  Noted 15.2 white blood cell count lactic acid is within reference range at 0.9. [BC]   0948 CT abdomen and pelvis shows evidence of early pyelonephritis.  We have treated patient with 1 L of normal saline IV bolus and 1 g of Rocephin IV push here in the emergency department.  She is resting comfortably and has had no nausea or vomiting while in the emergency department.  Her pain is greatly improved.  I discussed options with patient and she states she would like to be discharged home if possible.  I believe this is reasonable as long as she is able to keep down p.o. fluids.  We are going to give her a p.o. challenge of fluids here in the emergency department.  If she is able to keep them down we will discharge her home with p.o. antibiotics and Zofran ODT. [BC]      ED Course User Index  [BC] Humberto Roche NP                           Clinical Impression:   Final diagnoses:  [N12] Pyelitis (Primary)        ED Disposition Condition    Discharge Stable          ED Prescriptions       Medication Sig Dispense Start Date End Date Auth. Provider    ondansetron (ZOFRAN-ODT) 4 MG TbDL Take 2 tablets (8 mg total) by mouth 2 (two) times daily. 20 tablet 2/5/2025 -- Humberto Roche NP    sulfamethoxazole-trimethoprim 800-160mg (BACTRIM DS) 800-160 mg Tab Take 1 tablet by mouth 2 (two) times daily. for 7 days 14 tablet 2/5/2025 2/12/2025 Humberto Roche NP          Follow-up Information       Follow up With Specialties Details Why Contact Info    Terrence Breaux NP Family Medicine Schedule an appointment as soon as possible for a visit in 2 days Follow-up of today's visit [address] [facility] [place] [address] [phone]               Humberto Roche NP  02/05/25  4030

## 2025-02-05 NOTE — DISCHARGE INSTRUCTIONS
Follow up with Terrence mejia in 1-2 days.  Return to the emergency department for any worsening condition or any concerns.  Take antibiotics as directed.  Use Zofran as needed for any nausea or vomiting.

## 2025-02-05 NOTE — ED NOTES
Pt given home care and follow up instructions. Pt has a scheduled appointment for follow up tomorrow with PCP. Pt denies any pain or nausea at this time. All VS are WNL.

## 2025-02-05 NOTE — Clinical Note
"Mariposa Curry" Miguel was seen and treated in our emergency department on 2/5/2025.  She may return to work on 02/07/2025.       If you have any questions or concerns, please don't hesitate to call.      Humberto Roche, NP"

## 2025-02-07 LAB — UA COMPLETE W REFLEX CULTURE PNL UR: ABNORMAL
